# Patient Record
Sex: FEMALE | Race: WHITE | NOT HISPANIC OR LATINO | Employment: OTHER | URBAN - METROPOLITAN AREA
[De-identification: names, ages, dates, MRNs, and addresses within clinical notes are randomized per-mention and may not be internally consistent; named-entity substitution may affect disease eponyms.]

---

## 2017-02-13 DIAGNOSIS — Z94.0 KIDNEY REPLACED BY TRANSPLANT: Primary | ICD-10-CM

## 2017-02-13 DIAGNOSIS — Z94.83 PANCREAS REPLACED BY TRANSPLANT (H): ICD-10-CM

## 2017-02-13 NOTE — TELEPHONE ENCOUNTER
Drug Name: prograf 0.5mg  Last Fill Date: 12/20/16  Quantity: 30    Drug Name: prograf 1mg  Last Fill Date: 12/20/16  Quantity: 60    Drug Name: mycophenolate 360mg  Last Fill Date: 12/20/16  Quantity: 120    Laura Rosen   [facility]  [phone]

## 2017-02-14 RX ORDER — MYCOPHENOLIC ACID 360 MG/1
360 TABLET, DELAYED RELEASE ORAL 2 TIMES DAILY
Qty: 120 TABLET | Refills: 0 | Status: SHIPPED | OUTPATIENT
Start: 2017-02-14 | End: 2017-04-07

## 2017-02-14 RX ORDER — TACROLIMUS 1 MG/1
1 CAPSULE, GELATIN COATED ORAL 2 TIMES DAILY
Qty: 60 CAPSULE | Refills: 0 | Status: SHIPPED | OUTPATIENT
Start: 2017-02-14 | End: 2017-03-10

## 2017-02-14 RX ORDER — TACROLIMUS 0.5 MG/1
0.5 CAPSULE, GELATIN COATED ORAL EVERY MORNING
Qty: 30 CAPSULE | Refills: 0 | Status: SHIPPED | OUTPATIENT
Start: 2017-02-14 | End: 2017-03-10

## 2017-02-16 PROCEDURE — 80197 ASSAY OF TACROLIMUS: CPT | Performed by: INTERNAL MEDICINE

## 2017-02-16 PROCEDURE — 80180 DRUG SCRN QUAN MYCOPHENOLATE: CPT | Performed by: INTERNAL MEDICINE

## 2017-02-17 ENCOUNTER — TELEPHONE (OUTPATIENT)
Dept: TRANSPLANT | Facility: CLINIC | Age: 47
End: 2017-02-17

## 2017-02-17 DIAGNOSIS — Z94.0 KIDNEY REPLACED BY TRANSPLANT: Primary | ICD-10-CM

## 2017-02-17 DIAGNOSIS — Z79.899 OTHER LONG TERM (CURRENT) DRUG THERAPY: ICD-10-CM

## 2017-02-17 DIAGNOSIS — Z94.83 PANCREAS REPLACED BY TRANSPLANT (H): ICD-10-CM

## 2017-02-17 LAB
TACROLIMUS BLD-MCNC: 5.8 UG/L (ref 5–15)
TME LAST DOSE: NORMAL H

## 2017-02-17 NOTE — TELEPHONE ENCOUNTER
Spoke to patient and she has appointment with PCP today to discuss, she did leave message back for Florinda to schedule follow up here.

## 2017-02-17 NOTE — TELEPHONE ENCOUNTER
Hemoglobin elevated  Please call pt to see if her PCP or nephrologist has addressed this? If not, she needs to contact them and be seen. If she needs a nephrologist please invite her back here.

## 2017-02-18 LAB
MYCOPHENOLATE SERPL LC/MS/MS-MCNC: 1.07 MG/L (ref 1–3.5)
MYCOPHENOLATE-G SERPL LC/MS/MS-MCNC: 42.5 MG/L (ref 30–95)
TME LAST DOSE: NORMAL H

## 2017-03-10 DIAGNOSIS — Z94.83 PANCREAS REPLACED BY TRANSPLANT (H): ICD-10-CM

## 2017-03-10 DIAGNOSIS — Z94.0 KIDNEY REPLACED BY TRANSPLANT: ICD-10-CM

## 2017-03-10 NOTE — TELEPHONE ENCOUNTER
Drug Name: prograf 1mg  Last Fill Date: 2/15/17  Quantity: 60    Drug Name: prograf 0.5mg  Last Fill Date: 2/15/1  Quantity: 30    Laura Rosen   [facility]  [phone]

## 2017-03-13 RX ORDER — TACROLIMUS 0.5 MG/1
0.5 CAPSULE, GELATIN COATED ORAL EVERY MORNING
Qty: 30 CAPSULE | Refills: 3 | Status: SHIPPED | OUTPATIENT
Start: 2017-03-13

## 2017-03-13 RX ORDER — TACROLIMUS 1 MG/1
1 CAPSULE, GELATIN COATED ORAL 2 TIMES DAILY
Qty: 60 CAPSULE | Refills: 3 | Status: SHIPPED | OUTPATIENT
Start: 2017-03-13

## 2017-04-07 DIAGNOSIS — Z94.83 PANCREAS REPLACED BY TRANSPLANT (H): ICD-10-CM

## 2017-04-07 DIAGNOSIS — Z94.0 KIDNEY REPLACED BY TRANSPLANT: Primary | ICD-10-CM

## 2017-04-07 RX ORDER — MYCOPHENOLIC ACID 360 MG/1
360 TABLET, DELAYED RELEASE ORAL 2 TIMES DAILY
Qty: 60 TABLET | Refills: 0 | Status: SHIPPED | OUTPATIENT
Start: 2017-04-07

## 2017-04-25 ENCOUNTER — TRANSFERRED RECORDS (OUTPATIENT)
Dept: HEALTH INFORMATION MANAGEMENT | Facility: CLINIC | Age: 47
End: 2017-04-25

## 2017-04-26 ENCOUNTER — TRANSFERRED RECORDS (OUTPATIENT)
Dept: HEALTH INFORMATION MANAGEMENT | Facility: CLINIC | Age: 47
End: 2017-04-26

## 2017-08-27 ENCOUNTER — HEALTH MAINTENANCE LETTER (OUTPATIENT)
Age: 47
End: 2017-08-27

## 2018-04-19 ENCOUNTER — TELEPHONE (OUTPATIENT)
Dept: TRANSPLANT | Facility: CLINIC | Age: 48
End: 2018-04-19

## 2018-04-19 NOTE — LETTER
OUTPATIENT LABORATORY TEST ORDER:  After First Year    Patient Name: Ronit Rodrigues                           Transplant Date: 10-  YOB: 1970                                   Issue Date & Time:April 19, 201812:57 PM    [facility] MR: [id number]                                   Exp. Date (1 year after date issued)    Diagnoses:   Kidney Transplant (ICD-10 Z94.0)      Pancreas Transplant (ICD-10 Z94.83)     Long term use of medications (ICD-10  Z79.899)               Lab results to be available on the same day drawn.   Please release results to patient upon their request    Please fax to the Transplant Center at [phone].       Monthly   Complete Blood Count with Platelets    Basic Metabolic Panel (Sodium, Potassium, Chloride, CO2, Creatinine, Urea Nitrogen, Glucose, Calcium)   Amylase, Lipase   /Tacrolimus/Prograf drug level (mail to [facility] mailers and instructions provided by the pt)     Mycophenolate level     Every 6 months,     Due: April and October            BK PCR Quantitative/Serum               Complete Lipid Panel fasting (Cholesterol, Triglycerides, HDL, LDL)            Glycosylated Hemoglobin (A1c)              Urine for Protein/Creatinine    Yearly      Due: June    DSA/PRA      If you have any questions, please call The Transplant Center at [phone] or [phone].      .

## 2018-04-19 NOTE — TELEPHONE ENCOUNTER
Provider Call: Provider Call: Transplant Lab/Orders  Route to LPN  Post Transplant Days: 3470  When patient is less than 60 days post-transplant, route high priority  Reason for Call: Liz a [facility] RN would like a list of labs that the patient needs and they will put in the order.patient will be getting labs done on 4/20/18.  Liver patients reporting abnormal lab results: Route to RN and Page  Document lab facility information when provider is calling about annual lab orders. Delete facility wildcards when not needed.  Facility Name: [facility]   Facility Location: [place], [place]  Outside Facility Fax Number: Clinic Fax To Marjorie [phone]  Callback needed? If needed

## 2018-05-14 DIAGNOSIS — Z94.83 PANCREAS REPLACED BY TRANSPLANT (H): ICD-10-CM

## 2018-05-14 DIAGNOSIS — Z94.0 KIDNEY REPLACED BY TRANSPLANT: ICD-10-CM

## 2018-05-14 PROCEDURE — 80197 ASSAY OF TACROLIMUS: CPT | Performed by: SURGERY

## 2018-05-14 PROCEDURE — 80180 DRUG SCRN QUAN MYCOPHENOLATE: CPT | Performed by: SURGERY

## 2018-05-15 LAB
TACROLIMUS BLD-MCNC: 5.7 UG/L (ref 5–15)
TME LAST DOSE: NORMAL H

## 2018-05-16 LAB
MYCOPHENOLATE SERPL LC/MS/MS-MCNC: 1.51 MG/L (ref 1–3.5)
MYCOPHENOLATE-G SERPL LC/MS/MS-MCNC: 43.2 MG/L (ref 30–95)
TME LAST DOSE: NORMAL H

## 2018-10-30 DIAGNOSIS — Z94.83 PANCREAS REPLACED BY TRANSPLANT (H): ICD-10-CM

## 2018-10-30 DIAGNOSIS — Z94.0 KIDNEY REPLACED BY TRANSPLANT: ICD-10-CM

## 2018-10-30 PROCEDURE — 80197 ASSAY OF TACROLIMUS: CPT | Performed by: SURGERY

## 2018-10-30 PROCEDURE — 80180 DRUG SCRN QUAN MYCOPHENOLATE: CPT | Performed by: INTERNAL MEDICINE

## 2018-10-31 ENCOUNTER — TELEPHONE (OUTPATIENT)
Dept: TRANSPLANT | Facility: CLINIC | Age: 48
End: 2018-10-31

## 2018-10-31 DIAGNOSIS — Z94.83 PANCREAS REPLACED BY TRANSPLANT (H): Primary | ICD-10-CM

## 2018-10-31 DIAGNOSIS — Z94.0 KIDNEY REPLACED BY TRANSPLANT: ICD-10-CM

## 2018-10-31 DIAGNOSIS — R79.9 ABNORMAL FINDING OF BLOOD CHEMISTRY: ICD-10-CM

## 2018-10-31 LAB
TACROLIMUS BLD-MCNC: 5.4 UG/L (ref 5–15)
TME LAST DOSE: NORMAL H

## 2018-10-31 NOTE — TELEPHONE ENCOUNTER
Provider Call: Transplant Lab/Orders  Route to LPN  Post Transplant Days: 3665  When patient is less than 60 days post-transplant, route high priority  Reason for Call: needs a lab order for Mycophenolate   Liver patients reporting abnormal lab results: Route to RN and Page  Document lab facility information when provider is calling about annual lab orders. Delete facility wildcards when not needed.  Facility Name: [facility]  Facility Location: [place]   Callback needed? no

## 2018-10-31 NOTE — TELEPHONE ENCOUNTER
Call placed to pt regarding her labs.  Pt last set of full transplant labs 5/2018.  Pt educated we would recommend she gets her labs monthly so we can cont monitor her kp closer.  Pt aware she has a standing order for monthly labs at her local facility. Pt verbalized she will try to get labs done every 3 months.     Pt questions answered, pt v/u.

## 2018-11-02 ENCOUNTER — TELEPHONE (OUTPATIENT)
Dept: TRANSPLANT | Facility: CLINIC | Age: 48
End: 2018-11-02

## 2018-11-02 LAB
MYCOPHENOLATE SERPL LC/MS/MS-MCNC: 12.97 MG/L (ref 1–3.5)
MYCOPHENOLATE-G SERPL LC/MS/MS-MCNC: 83.9 MG/L (ref 30–95)
TME LAST DOSE: ABNORMAL H

## 2018-11-04 NOTE — TELEPHONE ENCOUNTER
9:27 AM  November 4, 2018  Have made multiple attempts to contact Ronit mcginnis [facility] lab results, Friday evening and Saturday.  Her phone goes directly to [employer], left message requesting call back.  Her mother's phone just rings with no opportunity to leave VM.  Pauline Mills, RN, CCTC  On Call Organ Coordinator

## 2018-11-05 ENCOUNTER — TELEPHONE (OUTPATIENT)
Dept: TRANSPLANT | Facility: CLINIC | Age: 48
End: 2018-11-05

## 2018-11-05 NOTE — LETTER
PHYSICIAN ORDERS      DATE & TIME ISSUED: 2018 4:03 PM  PATIENT NAME: Ronit Rodrigues   : 1970     [facility] MR# [if applicable]: [id number]     DIAGNOSIS:  Kidney and pancreas transplant   ICD-10 CODE: Z94.0 and Z94.83      Please complete the following level this week   Mycophenolate level    Any questions please call: [phone] option #5    Please fax results to [phone].    .

## 2018-11-05 NOTE — LETTER
OUTPATIENT LABORATORY TEST ORDER:  After First Year    Patient Name: Ronit Rodrigues                           Transplant Date: 10-  YOB: 1970                                   Issue Date & Time: 11-6-2018 12:57 PM    [facility] MR: [id number]                                   Exp. Date (1 year after date issued)    Diagnoses:   Kidney Transplant (ICD-10 Z94.0)      Pancreas Transplant (ICD-10 Z94.83)     Long term use of medications (ICD-10  Z79.899)               Lab results to be available on the same day drawn.   Please release results to patient upon their request    Please fax to the Transplant Center at [phone].       Monthly   Complete Blood Count with Platelets    Basic Metabolic Panel (Sodium, Potassium, Chloride, CO2, Creatinine, Urea Nitrogen, Glucose, Calcium)   Amylase, Lipase   /Tacrolimus/Prograf drug level (mail to [facility] mailers and instructions provided by the pt)     Mycophenolate level     Every 6 months,     Due: April and October            BK PCR Quantitative/Serum               Complete Lipid Panel fasting (Cholesterol, Triglycerides, HDL, LDL)            Glycosylated Hemoglobin (A1c)              Urine for Protein/Creatinine    Yearly      Due: June    DSA/PRA      If you have any questions, please call The Transplant Center at [phone] or [phone].      .

## 2018-11-05 NOTE — TELEPHONE ENCOUNTER
ISSUE:  MPA level 12.9  Goal 1-3.5  Current dose 360 mg BID.     OUTCOME:   Call placed to pt, no answer.  Left detailed message letting her know her drug level was high, but it doesn't look like a good 12 hour trough.  Pt encouraged to call us back to let us know if it was a good trough and to confirm her current dose.  Pt should repeat lab end of this week.     ADDENDUM:  Pt returned call.  Pt reports this was a short trough.  Pt confirms her current MPA dose 360 mg BID.  Pt reports she is currently living in [place] for the next 4 months.  Pt will find a local lab and will call us back with name and fax number so we can send order for pt to repeat level this week.  Pt denies abd pain or diarrhea.

## 2018-11-06 NOTE — TELEPHONE ENCOUNTER
Patient Call: Transplant Lab/Orders  Route to LPN  Post Transplant Days: 3671  When patient is less than 60 days post-transplant, route high priority    Reason for Call: needing lab orders sent to [facility] in [place], [place] Phone [phone] and Fax [phone]. Patient would also like standing lab order sent being that she will be there over the winter months.  Callback needed? If needed

## 2018-11-13 NOTE — TELEPHONE ENCOUNTER
Call placed to Ronit to see if she was able to get MPA level redrawn.  No answer.  RNCC left message for pt to return call to let us know if she was able to repeat level and when she had it drawn.  If level not drawn, pt encouraged to have drug level checked this week. Pt to return call.

## 2018-11-15 NOTE — TELEPHONE ENCOUNTER
Call placed to lab pt provided for labs.  They don't have record of pt getting labs drawn.      Call placed to pt, no answer.  Left message asking pt to repeat her MPA level d/t last MPA level elevated and not a good 12 hour trough.  Pt encouraged to call us back letting us know if she went elsewhere for labs or if she hasn't had a chance to get them done yet.

## 2019-09-12 ENCOUNTER — TELEPHONE (OUTPATIENT)
Dept: TRANSPLANT | Facility: CLINIC | Age: 49
End: 2019-09-12

## 2019-09-12 NOTE — TELEPHONE ENCOUNTER
Patient Call: Transplant Lab/Orders  Route to LPN  Post Transplant Days: 3981  When patient is less than 60 days post-transplant, route high priority    Reason for Call: Discuss lab results; which results? Recent results 9/12/19 when received    Callback needed? Yes    Return Call Needed  Same as documented in contacts section  When to return call?: Greater than one day: Route standard priority

## 2019-09-13 ENCOUNTER — TRANSFERRED RECORDS (OUTPATIENT)
Dept: HEALTH INFORMATION MANAGEMENT | Facility: CLINIC | Age: 49
End: 2019-09-13

## 2019-10-03 NOTE — TELEPHONE ENCOUNTER
Attempted to return pt call regarding questions re 9/12/19 lab results.  No answer.  Left message for pt to return call if she still has questions re lab results.

## 2019-11-07 ENCOUNTER — HEALTH MAINTENANCE LETTER (OUTPATIENT)
Age: 49
End: 2019-11-07

## 2019-11-20 ENCOUNTER — TELEPHONE (OUTPATIENT)
Dept: TRANSPLANT | Facility: CLINIC | Age: 49
End: 2019-11-20

## 2019-11-20 NOTE — TELEPHONE ENCOUNTER
Ronit Rodrigues called re: fat under chin lipo suction and wants recommendation from Transplant Team.  Wants a written clearance. Still does not know which facility she is going to have it completed yet.  States the actual procedure is called lipotherme. Made aware will update her once I hear from the Txp Team.  Lipotherme (aka laser-assisted lipolysis) is a form of liposuction that uses the heat from a laser to melt fat before suctioning it out using a small cannula. Like all fat-reduction procedures    Appreciates help.

## 2019-11-26 NOTE — TELEPHONE ENCOUNTER
Has not been seen at the  since 2015.  Per Dr. Dutta will need to be seen here at the [facility] if she is requesting clearance for procedure from Provider here.  Otherwise, she will need to ask for clearance from Dr. Puga who she reports she saw this year.

## 2020-02-17 ENCOUNTER — HEALTH MAINTENANCE LETTER (OUTPATIENT)
Age: 50
End: 2020-02-17

## 2020-05-05 ENCOUNTER — TRANSFERRED RECORDS (OUTPATIENT)
Dept: HEALTH INFORMATION MANAGEMENT | Facility: CLINIC | Age: 50
End: 2020-05-05

## 2020-11-29 ENCOUNTER — HEALTH MAINTENANCE LETTER (OUTPATIENT)
Age: 50
End: 2020-11-29

## 2021-02-14 ENCOUNTER — HEALTH MAINTENANCE LETTER (OUTPATIENT)
Age: 51
End: 2021-02-14

## 2021-04-10 ENCOUNTER — HEALTH MAINTENANCE LETTER (OUTPATIENT)
Age: 51
End: 2021-04-10

## 2021-06-08 ENCOUNTER — TRANSFERRED RECORDS (OUTPATIENT)
Dept: HEALTH INFORMATION MANAGEMENT | Facility: CLINIC | Age: 51
End: 2021-06-08
Payer: MEDICARE

## 2021-09-25 ENCOUNTER — HEALTH MAINTENANCE LETTER (OUTPATIENT)
Age: 51
End: 2021-09-25

## 2021-12-08 ENCOUNTER — TELEPHONE (OUTPATIENT)
Dept: TRANSPLANT | Facility: CLINIC | Age: 51
End: 2021-12-08
Payer: MEDICARE

## 2021-12-08 DIAGNOSIS — R74.8 ELEVATED AMYLASE AND LIPASE: ICD-10-CM

## 2021-12-08 DIAGNOSIS — Z48.298 AFTERCARE FOLLOWING ORGAN TRANSPLANT: ICD-10-CM

## 2021-12-08 DIAGNOSIS — E66.9 OBESITY (BMI 30-39.9): Primary | ICD-10-CM

## 2021-12-08 DIAGNOSIS — Z94.83 PANCREAS TRANSPLANTED (H): ICD-10-CM

## 2021-12-08 DIAGNOSIS — K43.9 HERNIA, VENTRAL: ICD-10-CM

## 2021-12-08 NOTE — LETTER
[facility] - CT Scan    [address] [place] [address]    [phone]     2021        Dear [facility] Department,       Please push images from CT scan-abdomen/pelvis completed on 21 to [facility] for patient:    Ronit Rodrigues ( 1970)  [address] [address]      Images needed prior to 2021 transplant surgery appointment.   Thank you,    Nina Hweitt, RN, BSN  Solid Organ Transplant, Post Kidney and Pancreas  Transplant Care Coordinator  [phone]

## 2021-12-08 NOTE — LETTER
PHYSICIAN ORDERS      DATE & TIME ISSUED: 2021 9:04 AM  PATIENT NAME: Ronit Rodrigues   : 1970     [facility] MR# [if applicable]: [id number]     DIAGNOSIS/ICD-10 CODES: Pancreas Transplanted (Z94.83);   Elevated Amylase and Lipase (R74.8)    Please complete the following imaging on 12/10/21:  Ultrasound Pancreas Transplant    Any questions please call: [phone]  Fax results to [phone]      Tom Whitley MD

## 2021-12-08 NOTE — LETTER
PHYSICIAN ORDERS      DATE & TIME ISSUED: 2021 4:52 PM  PATIENT NAME: Ronit Rodrigues   : 1970     [facility] MR# [if applicable]: [id number]     DIAGNOSIS/ICD-10 CODES: Pancreas Transplanted (Z94.83);   Kidney Transplanted;   Elevated Amylase and Lipase (R74.8)    Please draw the following labs on 12/10/21:  - Complete Blood Count with Platelets   - Basic Metabolic Panel (Sodium, Potassium, Chloride, CO2, Creatinine, Urea Nitrogen, Glucose, Calcium)  - Amylase, Lipase  - /Tacrolimus/Prograf drug level (ensure 12 hour trough level)           - Mycophenolate level    - Glycosylated Hemoglobin (A1c)     Any questions please call: [phone]  Fax results to [phone]      Tom Whitley MD

## 2021-12-08 NOTE — TELEPHONE ENCOUNTER
Spoke with [facility] nurse for Dr. Puga, who manages patient. They want to re-refer patient for pancreas transplant. Also spoke with patient.     Elevated pancreas enzymes on labs 12/6/21 (Labs prior to this from 12/28/20): Last HgbA1C 5.2 on 12/28/20:   Lipase 426 (8-78 U/L) baseline 10-30s  Amylase 327 ( U/L) baseline 40-50s  On lantus and humolog per nurse report however per patient, Not taking insulin or checking BG.     Pt states she ate greasy food same day of labs.   Recommend repeat pancreas enzyme levels within the next week now that she reports good bowel clean out. Discussed daily bowel movements, applesauce consistency. If not having then recommend adding Miralax PRN.     Abdominal pain noted. Not eating for couple days due to things not moving through, bloating/gas. Mentions hernia rupture; but she had not had repaired. General surgery appt tomorrow. Will discuss her CT results from 12/6/21 with surgeon as well as gallbladder stones.      IMPRESSION:     1. Large midline infraumbilical ventral wall hernia containing multiple noninflamed and nonobstructed loops of small bowel.   2. Small midline supraumbilical fat-containing hernia/diastases.   3. Left lower quadrant renal transplant, and right lower quadrant pancreas transplant without evident complication on this unenhanced examination.   4. Cholelithiasis.    Taking :  -Prograf 1.5 mg AM/1 mg PM. Last tacrolimus level not accurate; had taken prograf dose that morning.  - mg BID.   -Chlorthalidone 25 mg daily    Wants referral for hernia repair to be done with Dr. Durant/Dr. Goldman. Consider re-establishing care at .     [facility] reviewed patient in chronic review meeting with Dr. Goldman and Dr. Whitley. Enteric drained pancreas now 13 years out from transplant. Order DSA, 12 hour tacrolimus and MPA levels, HgbA1C. (Lab orders sent.) Get pancreas ultrasound (scheduled 3 pm 12/13/21 [facility]). Scheduled visit with Dr. Awan  Susi 4 pm on 12/13/21. Ronit Hewitt, RN, BSN  Solid Organ Transplant, Post Kidney and Pancreas  Transplant Care Coordinator  [phone]     Request for CT images to be pushed to [facility] from 12/16/21:   [facility]    [address] [place] [address]    [phone]

## 2021-12-08 NOTE — LETTER
OUTPATIENT LABORATORY TEST ORDER:  After First Year    Patient Name: Ronit Rodrigues                           Transplant Date: 10-  YOB: 1970                                   Issue Date & Time: 11-6-2018 12:57 PM    [facility] MR: [id number]                                   Exp. Date (1 year after date issued)    Diagnoses:   Kidney Transplant (ICD-10 Z94.0)      Pancreas Transplant (ICD-10 Z94.83)     Long term use of medications (ICD-10  Z79.899)               Lab results to be available on the same day drawn.   Please release results to patient upon their request    Please fax to the Transplant Center at [phone].       Monthly   Complete Blood Count with Platelets    Basic Metabolic Panel (Sodium, Potassium, Chloride, CO2, Creatinine, Urea Nitrogen, Glucose, Calcium)   Amylase, Lipase   /Tacrolimus/Prograf drug level (mail to [facility] mailers and instructions provided by the pt)     Mycophenolate level     Every 6 months,     Due: April and October            BK PCR Quantitative/Serum               Complete Lipid Panel fasting (Cholesterol, Triglycerides, HDL, LDL)            Glycosylated Hemoglobin (A1c)              Urine for Protein/Creatinine    Yearly      Due: June    DSA/PRA      If you have any questions, please call The Transplant Center at [phone] or [phone].      .

## 2021-12-10 ENCOUNTER — LAB (OUTPATIENT)
Dept: LAB | Facility: CLINIC | Age: 51
End: 2021-12-10
Payer: MEDICARE

## 2021-12-10 PROCEDURE — 36415 COLL VENOUS BLD VENIPUNCTURE: CPT

## 2021-12-10 PROCEDURE — 80197 ASSAY OF TACROLIMUS: CPT

## 2021-12-11 LAB
TACROLIMUS BLD-MCNC: 4.9 UG/L (ref 5–15)
TME LAST DOSE: ABNORMAL H
TME LAST DOSE: ABNORMAL H

## 2021-12-13 ENCOUNTER — OFFICE VISIT (OUTPATIENT)
Dept: TRANSPLANT | Facility: CLINIC | Age: 51
End: 2021-12-13
Attending: INTERNAL MEDICINE
Payer: MEDICARE

## 2021-12-13 ENCOUNTER — ANCILLARY PROCEDURE (OUTPATIENT)
Dept: ULTRASOUND IMAGING | Facility: CLINIC | Age: 51
End: 2021-12-13
Attending: INTERNAL MEDICINE
Payer: MEDICARE

## 2021-12-13 VITALS
WEIGHT: 219 LBS | DIASTOLIC BLOOD PRESSURE: 85 MMHG | HEART RATE: 100 BPM | BODY MASS INDEX: 36.44 KG/M2 | OXYGEN SATURATION: 97 % | SYSTOLIC BLOOD PRESSURE: 144 MMHG

## 2021-12-13 DIAGNOSIS — Z94.83 PANCREAS TRANSPLANTED (H): ICD-10-CM

## 2021-12-13 DIAGNOSIS — Z48.298 AFTERCARE FOLLOWING ORGAN TRANSPLANT: ICD-10-CM

## 2021-12-13 DIAGNOSIS — K43.9 VENTRAL HERNIA WITHOUT OBSTRUCTION OR GANGRENE: ICD-10-CM

## 2021-12-13 DIAGNOSIS — R74.8 ELEVATED AMYLASE AND LIPASE: ICD-10-CM

## 2021-12-13 DIAGNOSIS — E66.01 MORBID OBESITY (H): Primary | ICD-10-CM

## 2021-12-13 PROCEDURE — 99203 OFFICE O/P NEW LOW 30 MIN: CPT | Performed by: SURGERY

## 2021-12-13 PROCEDURE — 93975 VASCULAR STUDY: CPT | Mod: GC | Performed by: RADIOLOGY

## 2021-12-13 RX ORDER — CHOLECALCIFEROL (VITAMIN D3) 50 MCG
2 TABLET ORAL DAILY
COMMUNITY

## 2021-12-13 NOTE — PROGRESS NOTES
Transplant Surgery Progress Note    Transplants:  10/18/2008 (Kidney / Pancreas)     S: amylase/lipase have normalized. Graft function remains excellent. She follows up today for evaluation of large incisional hernia. She previously had this repaired with mesh in 2012 (ventrio ST lower, ventralex ST higher). She had a fairly prompt recurrence of hernia due to abdominal straining. She did see the complex hernia group here for consideration of repair some years ago (Isaac Tarango and Noreen). She was advised to lose weight, either on her own or with bariatric surgery intervention. She did lose the weight on her own but then decided against hernia repair.    She feels that her hernia has been growing since that time and is interested in repair. She is additionally troubled by feelings of back pain and abdominal 'instability' in core strength. She is on tac/mpa immsx maintenance and is a never smoker.  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   Transplant Date:  10/18/2008 (Kidney / Pancreas)  Baseline Cr: 0.96  De[place]ovo DSA: No    Acute Rejection Hx:  No    Present Maintenance Immunosuppression:  Tacrolimus and Mycophenolic acid    Transplant Coordinator: Lubna Perez     Transplant Office Phone Number: [phone]     Immunosuppressant Medications     Immunosuppressive Agents Disp Start End     mycophenolic acid (MYFORTIC - GENERIC EQUIVALENT) 360 MG EC tablet    60 tablet 4/7/2017     Sig - Route: Take 1 tablet (360 mg) by mouth 2 times daily Annual appointment needed for medication refills - Oral    Class: E-Prescribe    Notes to Pharmacy: Appointment and labs needed     PROGRAF 0.5 MG PO CAPSULE    30 capsule 3/13/2017     Sig - Route: Take 1 capsule (0.5 mg) by mouth every morning Take with 1 mg capsule. Total dose is 1.5 mg every morning and 1 mg every evening. - Oral    Class: E-Prescribe     PROGRAF 1 MG PO CAPSULE    60 capsule 3/13/2017     Sig - Route:  Take 1 capsule (1 mg) by mouth 2 times daily Take with 0.5 mg capsules. Total dose is 1.5 mg every morning and 1 mg every evening. - Oral    Class: E-Prescribe          Possible Immunosuppression-related side effects:   []             headache  []             vivid dreams  []             irritability  []             cognitive difficuties  []             fine tremor  []             nausea  []             diarrhea  []             neuropathy      []             edema  []             renal calcineurin toxicity  []             hyperkalemia  []             post-transplant diabetes  []             decreased appetite  []             increased appetite  []             other:  []             none    Prescription Medications as of 8/19/2022       Rx Number Disp Refills Start End Last Dispensed Date Next Fill Date Owning Pharmacy    chlorthalidone (HYGROTEN) 25 MG tablet  32 tablet 0 8/27/2012    [facility] [place] [address]    Sig: Take 1 tablet by mouth daily.    Class: E-Prescribe    Notes to Pharmacy: Needs to see MD for further refills    Route: Oral    Cosign for Ordering: Accepted by Cj Dutta MD on 8/27/2012  4:11 PM    mycophenolic acid (MYFORTIC - GENERIC EQUIVALENT) 360 MG EC tablet  60 tablet 0 4/7/2017    [facility] - [place], [place] - [facility] [address]    Sig: Take 1 tablet (360 mg) by mouth 2 times daily Annual appointment needed for medication refills    Class: E-Prescribe    Notes to Pharmacy: Appointment and labs needed    Route: Oral    Omega-3 Fatty Acids (OMEGA-3 FISH OIL PO)            Sig: Take by mouth daily    Class: Historical    Route: Oral    phentermine 15 MG capsule  30 capsule 3 2/12/2015    [id number] - [place], [place] - [address]    Sig: Take 1 capsule (15 mg) by mouth daily    Class: No Print Out    Route: Oral    polyethylene glycol (MIRALAX/GLYCOLAX) packet  30 packet 5 6/12/2015    [facility] - [address]    Sig: Take 17 g by mouth daily    Class: E-Prescribe    Route: Oral    Prenatal Vit-Fe Fumarate-FA (PNV PRENATAL PLUS MULTIVITAMIN) 27-1 MG TABS  30 tablet 11 6/12/2015    [facility] - [address]    Sig: Take 1 tablet by mouth daily    Class: E-Prescribe    Route: Oral    PROGRAF 0.5 MG PO CAPSULE  30 capsule 3 3/13/2017    [facility] [address]    Sig: Take 1 capsule (0.5 mg) by mouth every morning Take with 1 mg capsule. Total dose is 1.5 mg every morning and 1 mg every evening.    Class: E-Prescribe    Route: Oral    PROGRAF 1 MG PO CAPSULE  60 capsule 3 3/13/2017    [facility] [address]    Sig: Take 1 capsule (1 mg) by mouth 2 times daily Take with 0.5 mg capsules. Total dose is 1.5 mg every morning and 1 mg every evening.    Class: E-Prescribe    Route: Oral    vitamin D3 (CHOLECALCIFEROL) 50 mcg (2000 units) tablet        [facility]    Sig: Take 2 tablets by mouth daily    Class: Historical    Route: Oral          O:      General Appearance: in no apparent distress.   Skin: Normal, no rashes or jaundice  Heart: regular rate and rhythm, normal S1 and S2  Lungs: easy respirations, no audible wheezing.  Abdomen: abdomen soft, nontender. No tenderness over grafts. No ascites or masses. Large midline hernia- soft and reducible.  Extremities: Tremor absent.   Edema: absent.    Transplant Immunosuppression Labs Latest Ref Rng & Units 10/30/2018 5/14/2018 2/16/2017 3/9/2016 10/29/2015   Siro Level 5.0 - 15.0 ug/L - - - - -   Siro Level - - - - - -   Tacro Level 5.0 - 15.0 ug/L 5.4 5.7 5.8 3.7(L) 7.8   Tacro Level - 10/29/2018 12:00 0001 05/14/2018 2/15/17 2300 03/08/16 2300 [id number]   Creat 0.52 - 1.04 mg/dL - - - - -   BUN 7 - 30 mg/dL - - - - -   WBC 4.0 - 11.0  10e9/L - - - - -   Neutrophil % - - - - -   ANEU 1.6 - 8.3 10e9/L - - - - -       Chemistries:   Recent Labs   Lab Test 06/12/15  0640   BUN 10   CR 0.75   GFRESTIMATED 84   GLC 73     Lab Results   Component Value Date    A1C 5.4 12/17/2014     Recent Labs   Lab Test 06/08/15  1035   ALBUMIN 3.6   BILITOTAL 0.4   ALKPHOS 75   AST 23   ALT 23     Urine Studies:  Recent Labs   Lab Test 06/08/15  2240   COLOR Yellow   APPEARANCE Clear   URINEGLC Negative   URINEBILI Negative   URINEKETONE 10*   SG 1.025   UBLD Negative   URINEPH 5.0   PROTEIN 10*   NITRITE Negative   LEUKEST Negative   RBCU 1   WBCU 1     No lab results found.  Hematology:   Recent Labs   Lab Test 06/12/15  0640 06/11/15  0630 06/10/15  0612   HGB 14.9 14.5 14.6    242 207   WBC 6.6 7.8 8.1     Coags:   No lab results found.  HLA antibodies:   SA1 Hi Risk Serene   Date Value Ref Range Status   09/14/2016 None  Final     SA1 Mod Risk Serene   Date Value Ref Range Status   09/14/2016 B:76  Final     SA2 Hi Risk Serene   Date Value Ref Range Status   09/14/2016 DQ:4 8  Final     SA2 Mod Risk Serene   Date Value Ref Range Status   09/14/2016 DRw:53 DQ:7 9  Final       Assessment: Ronit Rodrigues is doing well s/p DDKT (SPK):  Issues we addressed during her visit include:    Incisional hernia: BMI 37 today. Target BMI for repair ~30, weight 179lbs. Saw Link Tarango and Noreen with complex hernia group previously. She lost weight on her own at that time but decided against surgery. Advised that she will need weight loss prior to proceeding this time as well to help ensure likelihood of success. She will likely need a component separation repair with mesh. Given the multidisciplinary nature of this approach and the need for bariatric SWL versus surgery, will re-refer to weight management and the complex hernia group here.      Plan:    1. Graft function: good, stable  2. Immunosuppression Management: No change no change continue tac and mpa .  Complexity of  management:Low.  Contributing factors: none  3. hernia: as above  Followup: PRN    Total Time: 30 min,   Counselling Time: 25 min.      Lv Goldman MD

## 2021-12-13 NOTE — LETTER
12/13/2021         RE: Ronit Rodrigues  [address]  [facility] [address]        Dear Colleague,    Thank you for referring your patient, Ronit Rodrigues, to the [facility]. Please see a copy of my visit note below.    Lipase/amylase normalized. Graft function remains excellent    Concerned about large incisional hernia. Has back pain and feelings of abdominal weakness and instability. Had it repaired previously with fairly prompt recurrence due to abdominal straining. Feels hernia has been progressing since that time and is interested in repair.     Pancreas graft is functioning, never smoker, on tac/mpa maintenance.    BMI 37 today. Target BMI for repair ~30, weight 179lbs. Saw Link Tarango and Noreen with complex hernia group previously. She lost weight on her own at that time but decided against surgery. Advised that she will need weight loss prior to proceeding this time as well to help ensure likelihood of success. She will likely need a component separation repair with mesh. Given the multidisciplinary nature of this approach and the need for bariatric SWL versus surgery, will re-refer to the complex hernia group here      Again, thank you for allowing me to participate in the care of your patient.        Sincerely,        Lv Goldman MD

## 2021-12-15 NOTE — TELEPHONE ENCOUNTER
"- Lab results for pancreas (amylase & lipase) enzymes normalized.   - HgbA1C 5.4.   - Tacrolimus 12 hour trough 4.9 on 12/10/21 1128 AM, goal 5-8, meds taken at 9PM? Late trough?    Results from pancreas transplant ultrasound:  Study Result    Narrative & Impression   EXAMINATION: US PANCREAS TRANSPLANT, 12/13/2021 3:14 PM      COMPARISON: CT abdomen and pelvis 6/8/2015     HISTORY: Elevated pancreatic enzymes.     TECHNIQUE:  Grey-scale, color Doppler and spectral flow analysis.     Findings:  Limited examination due to presence of large amount of bowel gas from  overlying ventral wall hernia.     The transplanted pancreas is located in the right lower quadrant and  demonstrates normal echogenicity. There is no free fluid adjacent to  the transplant pancreas. The pancreatic duct measures 2.3 mm.     Transplant pancreas arterial flow:   Within pancreas: Not well-visualized.  Outside pancreas: 81 cm/sec.   Anastomosis: 98 cm/sec.     Transplant pancreas venous flow:  Within pancreas: Not well visualized.  Outside pancreas: 29 cm/sec.   Anastomosis: 53 cm/sec.     Iliac artery:  Above the anastomosis: 177 cm/sec.  Below the anastomosis: 150 cm/sec.     Iliac vein  Above the transplant: 73 cm/sec.  Below the transplant: 23 cm/sec.                                                                      Impression:   Limited evaluation due to bowel gas from overlying large ventral wall  hernia.  1. Normal grayscale ultrasound appearance of the right lower quadrant  pancreas transplant. No pancreatic ductal dilatation.  2. No evidence of arterial or venous stenosis.     I have personally reviewed the examination and initial interpretation  and I agree with the findings.     JONI BENITEZ DO         SYSTEM ID:  [id number]     Transplant surgery appointment completed 12/13/2021 for ventral hernia findings on outside CT scan done 12/6/21. Per Dr. Goldman visit notes, \"target BMI for repair ~30, weight 179 lbs.\" Current BMI " "37, weight 219 lbs. \"Re-refer to the complex hernia group.\" Need for weight loss management team.     RNCC called office of Dr. Puga to update nurse.    Nina Hewitt RN, BSN  Solid Organ Transplant, Post Kidney and Pancreas  Transplant Care Coordinator  [phone]         "

## 2021-12-21 NOTE — TELEPHONE ENCOUNTER
RNCC spoke with Ronit in regards to seeing Corinne Saul PA-C with [facility] weight management surgery team. Gave recommendation per Dr. Durant for gastric sleeve given BMI 37. She declined referral for bariatric surgery and states that she was encouraged by her visit with Dr. Goldman that because she lost the weight before on her own, she could lose the weight again with lifestyle changes and coaching from dietician. Ordered referral to weight management/[facility] for dietician consult; notified Dr. Goldman.

## 2021-12-22 NOTE — TELEPHONE ENCOUNTER
RE: Weight management referral  Received: Yesterday  Lv Goldman MD [place], Christin Rebecca, CHAGO Wood-     That sounds good. She should see the complex hernia group, but needs to lose the weight first, or get pretty close. I'd tell her to work with her dietary folks until her weight is close to BMI 30 (I think 170 for her or so) and then we can refer her to the hernia folks. If she's not able to make progress with weight loss on her own in a few months we can offer her a referral to bariatrics at that point again, just so she's not spinning her wheels.     Thanks!  Lv             Previous Messages       ----- Message -----   From: Nina Hewitt RN   Sent: 12/21/2021   4:58 PM CST   To: Lv Goldman MD   Subject: Weight management referral                       Dr. Goldman,     I spoke with Ronit in regards to referral for bariatric surgery for gastric sleeve; sounds pretty against gastric sleeve. She is interested in weight management/lifestyle program referral first with dietician to try to loose weight again on her own. I placed order for this. Let me know if there is anything else. She sounds like she is interested in seeing complex hernia provider as discussed at appointment; not sure if that is another referral?     Thanks,     Nina Hewitt, RN, BSN   Solid Organ Transplant, Post Kidney and Pancreas   Transplant Care Coordinator   [phone]

## 2022-02-01 ENCOUNTER — TELEPHONE (OUTPATIENT)
Dept: TRANSPLANT | Facility: CLINIC | Age: 52
End: 2022-02-01

## 2022-02-01 NOTE — TELEPHONE ENCOUNTER
Patient Call: Wanted to discuss care plan and health issues      Call back needed? Yes    Return Call Needed  Same as documented in contacts section  When to return call?: Greater than one day: Route standard priority

## 2022-02-02 NOTE — TELEPHONE ENCOUNTER
"Ronit states she has not heard from dietician as referred and has sought out dietician at [facility] for help losing weight. She wants to know how much weight she needs to loose. \"Feels that 40-45 lbs is above and beyond but doesn't want to mess around as she has to be under anesthesia for a number of hours.\"    Reviewed Dr. Goldman's visit notes from 12/13/2021:    Lipase/amylase normalized. Graft function remains excellent     Concerned about large incisional hernia. Has back pain and feelings of abdominal weakness and instability. Had it repaired previously with fairly prompt recurrence due to abdominal straining. Feels hernia has been progressing since that time and is interested in repair.      Pancreas graft is functioning, never smoker, on tac/mpa maintenance.     BMI 37 today. Target BMI for repair ~30, weight 179lbs. Saw Link Tarango and Noreen with complex hernia group previously. She lost weight on her own at that time but decided against surgery. Advised that she will need weight loss prior to proceeding this time as well to help ensure likelihood of success. She will likely need a component separation repair with mesh. Given the multidisciplinary nature of this approach and the need for bariatric SWL versus surgery, will re-refer to the complex hernia group here    Would like to address who is the best person to go to for hernia repair within complex hernia group here; is it option to start with video visit.     "

## 2022-03-12 ENCOUNTER — HEALTH MAINTENANCE LETTER (OUTPATIENT)
Age: 52
End: 2022-03-12

## 2022-05-01 ENCOUNTER — HEALTH MAINTENANCE LETTER (OUTPATIENT)
Age: 52
End: 2022-05-01

## 2022-05-31 ENCOUNTER — TELEPHONE (OUTPATIENT)
Dept: TRANSPLANT | Facility: CLINIC | Age: 52
End: 2022-05-31
Payer: MEDICARE

## 2022-06-03 NOTE — TELEPHONE ENCOUNTER
ISSUE: Patient states she was seen in [facility] yesterday 6/3/2022    Patient reports fatigue, chills, hot flashes, weakness X3-4 days X3 since June 2021     Labs unavailable for RNCC in care everywhere at this time, labs reviewed with patient via phone and are stable.    Encouraged patient to make appointment with PCP.      Lubna Perez RN   Transplant Coordinator  [phone]

## 2022-07-06 ENCOUNTER — TRANSFERRED RECORDS (OUTPATIENT)
Dept: HEALTH INFORMATION MANAGEMENT | Facility: CLINIC | Age: 52
End: 2022-07-06

## 2022-08-19 ENCOUNTER — TRANSFERRED RECORDS (OUTPATIENT)
Dept: HEALTH INFORMATION MANAGEMENT | Facility: CLINIC | Age: 52
End: 2022-08-19

## 2022-08-19 PROBLEM — E66.01 MORBID OBESITY (H): Status: ACTIVE | Noted: 2022-08-19

## 2022-11-28 ENCOUNTER — TELEPHONE (OUTPATIENT)
Dept: TRANSPLANT | Facility: CLINIC | Age: 52
End: 2022-11-28

## 2022-11-28 ENCOUNTER — HOSPITAL ENCOUNTER (INPATIENT)
Facility: CLINIC | Age: 52
LOS: 2 days | Discharge: HOME | DRG: 438 | End: 2022-11-30
Attending: EMERGENCY MEDICINE | Admitting: SURGERY
Payer: MEDICARE

## 2022-11-28 ENCOUNTER — APPOINTMENT (OUTPATIENT)
Dept: CT IMAGING | Facility: CLINIC | Age: 52
DRG: 438 | End: 2022-11-28
Attending: EMERGENCY MEDICINE
Payer: MEDICARE

## 2022-11-28 DIAGNOSIS — K85.90 ACUTE PANCREATITIS, UNSPECIFIED COMPLICATION STATUS, UNSPECIFIED PANCREATITIS TYPE: ICD-10-CM

## 2022-11-28 DIAGNOSIS — Z11.52 ENCOUNTER FOR SCREENING LABORATORY TESTING FOR SEVERE ACUTE RESPIRATORY SYNDROME CORONAVIRUS 2 (SARS-COV-2): ICD-10-CM

## 2022-11-28 DIAGNOSIS — K56.609 SBO (SMALL BOWEL OBSTRUCTION) (H): ICD-10-CM

## 2022-11-28 LAB
ALBUMIN SERPL BCG-MCNC: 4 G/DL (ref 3.5–5.2)
ALP SERPL-CCNC: 85 U/L (ref 35–104)
ALT SERPL W P-5'-P-CCNC: 18 U/L (ref 10–35)
ANION GAP SERPL CALCULATED.3IONS-SCNC: 15 MMOL/L (ref 7–15)
AST SERPL W P-5'-P-CCNC: ABNORMAL U/L
BASOPHILS # BLD AUTO: 0 10E3/UL (ref 0–0.2)
BASOPHILS NFR BLD AUTO: 0 %
BILIRUB SERPL-MCNC: 0.6 MG/DL
BUN SERPL-MCNC: 23.2 MG/DL (ref 6–20)
CALCIUM SERPL-MCNC: 9.7 MG/DL (ref 8.6–10)
CHLORIDE SERPL-SCNC: 99 MMOL/L (ref 98–107)
CREAT SERPL-MCNC: 1.19 MG/DL (ref 0.51–0.95)
DEPRECATED HCO3 PLAS-SCNC: 22 MMOL/L (ref 22–29)
EOSINOPHIL # BLD AUTO: 0 10E3/UL (ref 0–0.7)
EOSINOPHIL NFR BLD AUTO: 0 %
ERYTHROCYTE [DISTWIDTH] IN BLOOD BY AUTOMATED COUNT: 14 % (ref 10–15)
GFR SERPL CREATININE-BSD FRML MDRD: 55 ML/MIN/1.73M2
GLUCOSE SERPL-MCNC: 116 MG/DL (ref 70–99)
HCT VFR BLD AUTO: 43.4 % (ref 35–47)
HGB BLD-MCNC: 14.4 G/DL (ref 11.7–15.7)
IMM GRANULOCYTES # BLD: 0.1 10E3/UL
IMM GRANULOCYTES NFR BLD: 1 %
LACTATE SERPL-SCNC: 1.2 MMOL/L (ref 0.7–2)
LIPASE SERPL-CCNC: 517 U/L (ref 13–60)
LYMPHOCYTES # BLD AUTO: 1.6 10E3/UL (ref 0.8–5.3)
LYMPHOCYTES NFR BLD AUTO: 12 %
MCH RBC QN AUTO: 27.9 PG (ref 26.5–33)
MCHC RBC AUTO-ENTMCNC: 33.2 G/DL (ref 31.5–36.5)
MCV RBC AUTO: 84 FL (ref 78–100)
MONOCYTES # BLD AUTO: 1 10E3/UL (ref 0–1.3)
MONOCYTES NFR BLD AUTO: 8 %
NEUTROPHILS # BLD AUTO: 10.5 10E3/UL (ref 1.6–8.3)
NEUTROPHILS NFR BLD AUTO: 79 %
NRBC # BLD AUTO: 0 10E3/UL
NRBC BLD AUTO-RTO: 0 /100
PLATELET # BLD AUTO: 290 10E3/UL (ref 150–450)
POTASSIUM SERPL-SCNC: 4.7 MMOL/L (ref 3.4–5.3)
PROT SERPL-MCNC: 8.6 G/DL (ref 6.4–8.3)
RBC # BLD AUTO: 5.17 10E6/UL (ref 3.8–5.2)
SODIUM SERPL-SCNC: 136 MMOL/L (ref 136–145)
WBC # BLD AUTO: 13.2 10E3/UL (ref 4–11)

## 2022-11-28 PROCEDURE — 83690 ASSAY OF LIPASE: CPT | Performed by: EMERGENCY MEDICINE

## 2022-11-28 PROCEDURE — 87799 DETECT AGENT NOS DNA QUANT: CPT | Performed by: EMERGENCY MEDICINE

## 2022-11-28 PROCEDURE — 36415 COLL VENOUS BLD VENIPUNCTURE: CPT | Performed by: EMERGENCY MEDICINE

## 2022-11-28 PROCEDURE — 84155 ASSAY OF PROTEIN SERUM: CPT | Performed by: EMERGENCY MEDICINE

## 2022-11-28 PROCEDURE — 99285 EMERGENCY DEPT VISIT HI MDM: CPT | Performed by: EMERGENCY MEDICINE

## 2022-11-28 PROCEDURE — G1010 CDSM STANSON: HCPCS | Performed by: RADIOLOGY

## 2022-11-28 PROCEDURE — 83605 ASSAY OF LACTIC ACID: CPT | Performed by: EMERGENCY MEDICINE

## 2022-11-28 PROCEDURE — 99221 1ST HOSP IP/OBS SF/LOW 40: CPT | Mod: AI | Performed by: SURGERY

## 2022-11-28 PROCEDURE — 96374 THER/PROPH/DIAG INJ IV PUSH: CPT

## 2022-11-28 PROCEDURE — 120N000002 HC R&B MED SURG/OB UMMC

## 2022-11-28 PROCEDURE — 258N000003 HC RX IP 258 OP 636: Performed by: EMERGENCY MEDICINE

## 2022-11-28 PROCEDURE — C9803 HOPD COVID-19 SPEC COLLECT: HCPCS

## 2022-11-28 PROCEDURE — 99285 EMERGENCY DEPT VISIT HI MDM: CPT | Mod: 25

## 2022-11-28 PROCEDURE — 74176 CT ABD & PELVIS W/O CONTRAST: CPT | Mod: 26 | Performed by: RADIOLOGY

## 2022-11-28 PROCEDURE — G1010 CDSM STANSON: HCPCS

## 2022-11-28 PROCEDURE — 85025 COMPLETE CBC W/AUTO DIFF WBC: CPT | Performed by: EMERGENCY MEDICINE

## 2022-11-28 PROCEDURE — 250N000011 HC RX IP 250 OP 636: Performed by: EMERGENCY MEDICINE

## 2022-11-28 PROCEDURE — U0003 INFECTIOUS AGENT DETECTION BY NUCLEIC ACID (DNA OR RNA); SEVERE ACUTE RESPIRATORY SYNDROME CORONAVIRUS 2 (SARS-COV-2) (CORONAVIRUS DISEASE [COVID-19]), AMPLIFIED PROBE TECHNIQUE, MAKING USE OF HIGH THROUGHPUT TECHNOLOGIES AS DESCRIBED BY CMS-2020-01-R: HCPCS | Performed by: EMERGENCY MEDICINE

## 2022-11-28 PROCEDURE — 96372 THER/PROPH/DIAG INJ SC/IM: CPT | Performed by: EMERGENCY MEDICINE

## 2022-11-28 RX ORDER — LORAZEPAM 2 MG/ML
1 INJECTION INTRAMUSCULAR ONCE
Status: DISCONTINUED | OUTPATIENT
Start: 2022-11-28 | End: 2022-11-30

## 2022-11-28 RX ORDER — PROCHLORPERAZINE 25 MG
25 SUPPOSITORY, RECTAL RECTAL EVERY 12 HOURS PRN
Status: DISCONTINUED | OUTPATIENT
Start: 2022-11-28 | End: 2022-11-30 | Stop reason: HOSPADM

## 2022-11-28 RX ORDER — SODIUM CHLORIDE 9 MG/ML
INJECTION, SOLUTION INTRAVENOUS CONTINUOUS
Status: DISCONTINUED | OUTPATIENT
Start: 2022-11-28 | End: 2022-11-28

## 2022-11-28 RX ORDER — ONDANSETRON 2 MG/ML
4 INJECTION INTRAMUSCULAR; INTRAVENOUS EVERY 6 HOURS PRN
Status: DISCONTINUED | OUTPATIENT
Start: 2022-11-28 | End: 2022-11-30

## 2022-11-28 RX ORDER — SODIUM CHLORIDE, SODIUM LACTATE, POTASSIUM CHLORIDE, CALCIUM CHLORIDE 600; 310; 30; 20 MG/100ML; MG/100ML; MG/100ML; MG/100ML
1000 INJECTION, SOLUTION INTRAVENOUS CONTINUOUS
Status: DISCONTINUED | OUTPATIENT
Start: 2022-11-28 | End: 2022-11-30

## 2022-11-28 RX ORDER — TACROLIMUS 0.5 MG/1
0.5 CAPSULE, GELATIN COATED ORAL EVERY MORNING
Status: DISCONTINUED | OUTPATIENT
Start: 2022-11-29 | End: 2022-11-29

## 2022-11-28 RX ORDER — OXYCODONE HYDROCHLORIDE 5 MG/1
5 TABLET ORAL EVERY 6 HOURS PRN
Status: DISCONTINUED | OUTPATIENT
Start: 2022-11-28 | End: 2022-11-29

## 2022-11-28 RX ORDER — ONDANSETRON 4 MG/1
4 TABLET, ORALLY DISINTEGRATING ORAL EVERY 6 HOURS PRN
Status: DISCONTINUED | OUTPATIENT
Start: 2022-11-28 | End: 2022-11-30 | Stop reason: HOSPADM

## 2022-11-28 RX ORDER — PROCHLORPERAZINE MALEATE 10 MG
10 TABLET ORAL EVERY 6 HOURS PRN
Status: DISCONTINUED | OUTPATIENT
Start: 2022-11-28 | End: 2022-11-30 | Stop reason: HOSPADM

## 2022-11-28 RX ORDER — MYCOPHENOLIC ACID 360 MG/1
360 TABLET, DELAYED RELEASE ORAL 2 TIMES DAILY
Status: DISCONTINUED | OUTPATIENT
Start: 2022-11-28 | End: 2022-11-30 | Stop reason: HOSPADM

## 2022-11-28 RX ORDER — TACROLIMUS 1 MG/1
1 CAPSULE, GELATIN COATED ORAL
Status: DISCONTINUED | OUTPATIENT
Start: 2022-11-28 | End: 2022-11-29

## 2022-11-28 RX ORDER — ACETAMINOPHEN 325 MG/1
650 TABLET ORAL EVERY 4 HOURS PRN
Status: DISCONTINUED | OUTPATIENT
Start: 2022-11-28 | End: 2022-11-30 | Stop reason: HOSPADM

## 2022-11-28 RX ADMIN — SODIUM CHLORIDE: 9 INJECTION, SOLUTION INTRAVENOUS at 20:47

## 2022-11-28 RX ADMIN — HYDROMORPHONE HYDROCHLORIDE 1 MG: 1 INJECTION, SOLUTION INTRAMUSCULAR; INTRAVENOUS; SUBCUTANEOUS at 18:28

## 2022-11-28 RX ADMIN — SODIUM CHLORIDE 1000 ML: 9 INJECTION, SOLUTION INTRAVENOUS at 19:46

## 2022-11-28 ASSESSMENT — ACTIVITIES OF DAILY LIVING (ADL)
ADLS_ACUITY_SCORE: 35

## 2022-11-28 NOTE — TELEPHONE ENCOUNTER
Is having emesis,her hernia is Tight in the abdominal area very painful   please call Ronit not sure what she needs to do

## 2022-11-28 NOTE — ED TRIAGE NOTES
"Triage Assessment & Note:    /69   Pulse 98   Temp 98.6  F (37  C) (Temporal)   Resp 16   Ht 1.651 m (5' 5\")   Wt 99.8 kg (220 lb)   SpO2 98%   BMI 36.61 kg/m      Patient presents with: PT BIBA for abd pain and is concerned for a strangulated hernia. PT was given 1 mg dil IM. PT is a kidney/panc txp pt.     Home Treatments/Remedies: 1mg Dil ems    Febrile / Afebrile? Afebrile     Duration of C/o: 24 hrs   Aimlcar Grewal RN  November 28, 2022       Triage Assessment     Row Name 11/28/22 1417       Triage Assessment (Adult)    Airway WDL WDL       Respiratory WDL    Respiratory WDL WDL       Cardiac WDL    Cardiac WDL WDL              "

## 2022-11-29 LAB
ANION GAP SERPL CALCULATED.3IONS-SCNC: 14 MMOL/L (ref 7–15)
BUN SERPL-MCNC: 21.3 MG/DL (ref 6–20)
CALCIUM SERPL-MCNC: 8.8 MG/DL (ref 8.6–10)
CHLORIDE SERPL-SCNC: 104 MMOL/L (ref 98–107)
CMV DNA SPEC NAA+PROBE-ACNC: NOT DETECTED IU/ML
CREAT SERPL-MCNC: 1.02 MG/DL (ref 0.51–0.95)
DEPRECATED HCO3 PLAS-SCNC: 21 MMOL/L (ref 22–29)
EBV DNA # SPEC NAA+PROBE: NOT DETECTED COPIES/ML
ERYTHROCYTE [DISTWIDTH] IN BLOOD BY AUTOMATED COUNT: 14.1 % (ref 10–15)
GFR SERPL CREATININE-BSD FRML MDRD: 67 ML/MIN/1.73M2
GLUCOSE SERPL-MCNC: 91 MG/DL (ref 70–99)
HCT VFR BLD AUTO: 39.5 % (ref 35–47)
HGB BLD-MCNC: 12.8 G/DL (ref 11.7–15.7)
LACTATE SERPL-SCNC: 0.7 MMOL/L (ref 0.7–2)
LIPASE SERPL-CCNC: 68 U/L (ref 13–60)
MAGNESIUM SERPL-MCNC: 1.6 MG/DL (ref 1.7–2.3)
MCH RBC QN AUTO: 27.6 PG (ref 26.5–33)
MCHC RBC AUTO-ENTMCNC: 32.4 G/DL (ref 31.5–36.5)
MCV RBC AUTO: 85 FL (ref 78–100)
PHOSPHATE SERPL-MCNC: 2.6 MG/DL (ref 2.5–4.5)
PLATELET # BLD AUTO: 237 10E3/UL (ref 150–450)
POTASSIUM SERPL-SCNC: 3.2 MMOL/L (ref 3.4–5.3)
RBC # BLD AUTO: 4.64 10E6/UL (ref 3.8–5.2)
SARS-COV-2 RNA RESP QL NAA+PROBE: NEGATIVE
SODIUM SERPL-SCNC: 139 MMOL/L (ref 136–145)
WBC # BLD AUTO: 7.9 10E3/UL (ref 4–11)

## 2022-11-29 PROCEDURE — 36415 COLL VENOUS BLD VENIPUNCTURE: CPT

## 2022-11-29 PROCEDURE — 250N000012 HC RX MED GY IP 250 OP 636 PS 637

## 2022-11-29 PROCEDURE — 85027 COMPLETE CBC AUTOMATED: CPT

## 2022-11-29 PROCEDURE — 83690 ASSAY OF LIPASE: CPT

## 2022-11-29 PROCEDURE — 99232 SBSQ HOSP IP/OBS MODERATE 35: CPT | Mod: FS | Performed by: SURGERY

## 2022-11-29 PROCEDURE — 250N000013 HC RX MED GY IP 250 OP 250 PS 637: Performed by: PHYSICIAN ASSISTANT

## 2022-11-29 PROCEDURE — 83735 ASSAY OF MAGNESIUM: CPT

## 2022-11-29 PROCEDURE — 120N000002 HC R&B MED SURG/OB UMMC

## 2022-11-29 PROCEDURE — 250N000013 HC RX MED GY IP 250 OP 250 PS 637

## 2022-11-29 PROCEDURE — 258N000003 HC RX IP 258 OP 636

## 2022-11-29 PROCEDURE — 84100 ASSAY OF PHOSPHORUS: CPT

## 2022-11-29 PROCEDURE — 80048 BASIC METABOLIC PNL TOTAL CA: CPT

## 2022-11-29 PROCEDURE — 83605 ASSAY OF LACTIC ACID: CPT

## 2022-11-29 PROCEDURE — 250N000012 HC RX MED GY IP 250 OP 636 PS 637: Performed by: SURGERY

## 2022-11-29 RX ORDER — POTASSIUM CHLORIDE 750 MG/1
40 TABLET, EXTENDED RELEASE ORAL ONCE
Status: COMPLETED | OUTPATIENT
Start: 2022-11-29 | End: 2022-11-29

## 2022-11-29 RX ORDER — ALBUTEROL SULFATE 90 UG/1
1-2 AEROSOL, METERED RESPIRATORY (INHALATION) EVERY 4 HOURS PRN
COMMUNITY

## 2022-11-29 RX ORDER — MAGNESIUM OXIDE 400 MG/1
400 TABLET ORAL 2 TIMES DAILY
Status: DISCONTINUED | OUTPATIENT
Start: 2022-11-29 | End: 2022-11-30 | Stop reason: HOSPADM

## 2022-11-29 RX ORDER — PHENTERMINE HYDROCHLORIDE 37.5 MG/1
37.5 CAPSULE ORAL DAILY
COMMUNITY
End: 2023-04-20

## 2022-11-29 RX ORDER — TACROLIMUS 1 MG/1
1 CAPSULE, GELATIN COATED ORAL EVERY EVENING
Status: DISCONTINUED | OUTPATIENT
Start: 2022-11-29 | End: 2022-11-30 | Stop reason: HOSPADM

## 2022-11-29 RX ADMIN — ACETAMINOPHEN 650 MG: 325 TABLET, FILM COATED ORAL at 18:23

## 2022-11-29 RX ADMIN — POTASSIUM CHLORIDE 40 MEQ: 750 TABLET, EXTENDED RELEASE ORAL at 09:34

## 2022-11-29 RX ADMIN — TACROLIMUS 1.5 MG: 1 CAPSULE, GELATIN COATED ORAL at 10:05

## 2022-11-29 RX ADMIN — MYCOPHENOLIC ACID 360 MG: 360 TABLET, DELAYED RELEASE ORAL at 00:06

## 2022-11-29 RX ADMIN — TACROLIMUS 1 MG: 1 CAPSULE, GELATIN COATED ORAL at 00:04

## 2022-11-29 RX ADMIN — TACROLIMUS 1 MG: 1 CAPSULE, GELATIN COATED ORAL at 18:23

## 2022-11-29 RX ADMIN — MYCOPHENOLIC ACID 360 MG: 360 TABLET, DELAYED RELEASE ORAL at 20:29

## 2022-11-29 RX ADMIN — Medication 400 MG: at 09:35

## 2022-11-29 RX ADMIN — SODIUM CHLORIDE, POTASSIUM CHLORIDE, SODIUM LACTATE AND CALCIUM CHLORIDE 1000 ML: 600; 310; 30; 20 INJECTION, SOLUTION INTRAVENOUS at 00:09

## 2022-11-29 RX ADMIN — Medication 400 MG: at 20:29

## 2022-11-29 RX ADMIN — OXYCODONE HYDROCHLORIDE 5 MG: 5 TABLET ORAL at 06:40

## 2022-11-29 RX ADMIN — OXYCODONE HYDROCHLORIDE 5 MG: 5 TABLET ORAL at 12:52

## 2022-11-29 RX ADMIN — MYCOPHENOLIC ACID 360 MG: 360 TABLET, DELAYED RELEASE ORAL at 09:35

## 2022-11-29 RX ADMIN — SODIUM CHLORIDE, POTASSIUM CHLORIDE, SODIUM LACTATE AND CALCIUM CHLORIDE 1000 ML: 600; 310; 30; 20 INJECTION, SOLUTION INTRAVENOUS at 20:00

## 2022-11-29 RX ADMIN — OXYCODONE HYDROCHLORIDE 5 MG: 5 TABLET ORAL at 00:06

## 2022-11-29 RX ADMIN — SODIUM CHLORIDE, POTASSIUM CHLORIDE, SODIUM LACTATE AND CALCIUM CHLORIDE 1000 ML: 600; 310; 30; 20 INJECTION, SOLUTION INTRAVENOUS at 10:05

## 2022-11-29 ASSESSMENT — ACTIVITIES OF DAILY LIVING (ADL)
DOING_ERRANDS_INDEPENDENTLY_DIFFICULTY: NO
WEAR_GLASSES_OR_BLIND: NO
ADLS_ACUITY_SCORE: 35
ADLS_ACUITY_SCORE: 35
DRESSING/BATHING_DIFFICULTY: NO
ADLS_ACUITY_SCORE: 35
NUMBER_OF_TIMES_PATIENT_HAS_FALLEN_WITHIN_LAST_SIX_MONTHS: 1
ADLS_ACUITY_SCORE: 35
CONCENTRATING,_REMEMBERING_OR_MAKING_DECISIONS_DIFFICULTY: NO
DIFFICULTY_EATING/SWALLOWING: NO
ADLS_ACUITY_SCORE: 18
FALL_HISTORY_WITHIN_LAST_SIX_MONTHS: YES
ADLS_ACUITY_SCORE: 35
CHANGE_IN_FUNCTIONAL_STATUS_SINCE_ONSET_OF_CURRENT_ILLNESS/INJURY: NO
ADLS_ACUITY_SCORE: 35
TOILETING_ISSUES: NO
WALKING_OR_CLIMBING_STAIRS_DIFFICULTY: NO

## 2022-11-29 ASSESSMENT — ENCOUNTER SYMPTOMS
SEIZURES: 0
DIFFICULTY URINATING: 0
NAUSEA: 1
ABDOMINAL PAIN: 1
VOMITING: 1
SHORTNESS OF BREATH: 0
HEADACHES: 0
DIARRHEA: 0
COUGH: 0
FEVER: 0
CONFUSION: 0
BACK PAIN: 0
APPETITE CHANGE: 1
EYE REDNESS: 0

## 2022-11-29 NOTE — PROGRESS NOTES
Pt seen at bedside, in which she reports improving abd pain and denies having N/V.    Pt is a 51yo woman with history of SPK in 2008 and large recurrent chronic ventral incisional hernia s/p repair in 2012 who presents with one day of abdominal pain and nausea/vomiting consistent with partial small bowel obstruction.     Physical Exam  General:  NAD, Resting comfortably, Cooperative  Chest:  NWOB on None (Room air), Lungs CTAB, S1, S2  Abdomen:  Obese, large infraumbilical hernia defect with palpable bowel contents that is soft and mildly tender, cannot be reduced.  No rebound tenderness, No guarding.  Extremities:  MAEx4, warm, well perfused    Will continue to monitor.

## 2022-11-29 NOTE — CONSULTS
Transplant Surgery Consult Note  11/28/2022     Date of admission: 11/28/2022    Reason for Consult: bowel obstruction    Consulting Service: ED      Assessment/Plan:  53yo woman with history of SPK in 2008 and large recurrent chronic ventral incisional hernia s/p repair in 2012 who presents with one day of abdominal pain and nausea/vomiting consistent with partial small bowel obstruction. Exam not concerning for acute abdomen. No obvious transition point on CT and patient had small soft BM this morning. Leukocytosis at 13, mildly elevated lipase at 517, Cr elevated at 1.1 from baseline of 1. Will admit for management SBO and workup of potential transplant rejection.     - Admit to transplant surgery, pancreas service  - Pain and nausea control  - NG decompression  - NPO, IVF bolus + maintenance  - Serial abdominal exam by surgery team tonight  - Repeat BMP, CBC, lipase, lactate in AM   - CMV and EBV PCR sent  - Continue immunosuppression medications: tacrolimus, mycophenolic acid       Discussed with transplant fellow Dr. Coyle, who discussed with staff.     Luma Leyva MD  Surgery PGY-2     Attestation: I saw and examined the patient with Kaleb Yesi Verma MD, and the transplant team. I independently reviewed all pertinent laboratory and imaging information and made management decisions including immunosuppression management. I agree with the findings and plan as documented in the note.  Lv Goldman MD  -----------------------------------------------------------  CC: abdominal pain    HPI: 53yo woman with h/o SPK in 2008, reflux pancreatitis of transplant in 2015, and large recurrent chronic ventral incisional hernia s/p repair in 2012 who presents with one day of abdominal pain, nausea and vomiting. RLQ abdominal pain started yesterday, hernia contents felt more bloated and tender. She had multiple episodes of bilious emesis. Today the pain did not improve so she came to the ED. Pain is on the right  side of her hernia, she did feel a bigger lump than before. Pain is somewhat similar in intensity an episode of transplant pancreatitis she had in the past, however it is not in the same location. She did have a small, soft BM this morning, and the pain/bloating/nausea has somewhat improved since arriving in the ED.     No fevers or chills. No chest pain or SOB. No skin changes over abdomen. No other complaints.     ROS: Negative except mentioned above    Past Medical Hx:  Past Medical History:   Diagnosis Date     Acute pancreatitis 6/8/2015     Asthma 11/8/2013     Back pain 7/22/2014     Diabetes (H)     history of diabetes     Dyslipidemia     Resolved after transplant     Encounter for long-term (current) use of medications      History of blood transfusion      Hypertension     Resolved after kidney transplant     Hypokalemia 6/10/2015     Hypomagnesemia 6/10/2015     Kidney replaced by transplant      Pancreas replaced by transplant (H)      Rash     on legs; boils     Reflux pancreatitis of transplanted pancreas 6/10/2015     Renal disease     history of ESRD     Ventral hernia 7/7/2015       Medications:  Prior to Admission medications    Medication Sig Last Dose Taking? Auth Provider Long Term End Date   chlorthalidone (HYGROTEN) 25 MG tablet Take 1 tablet by mouth daily.  Patient not taking: Reported on 12/13/2021   Cj Dutta MD     mycophenolic acid (MYFORTIC - GENERIC EQUIVALENT) 360 MG EC tablet Take 1 tablet (360 mg) by mouth 2 times daily Annual appointment needed for medication refills   Cj Dutta MD Yes    Omega-3 Fatty Acids (OMEGA-3 FISH OIL PO) Take by mouth daily  Patient not taking: Reported on 12/13/2021   Unknown, Entered By History     phentermine 15 MG capsule Take 1 capsule (15 mg) by mouth daily   Corinne Saul PA-C     polyethylene glycol (MIRALAX/GLYCOLAX) packet Take 17 g by mouth daily  Patient not taking: Reported on 12/13/2021   Nedra Saul  HARSHAD Granados     Prenatal Vit-Fe Fumarate-FA (PNV PRENATAL PLUS MULTIVITAMIN) 27-1 MG TABS Take 1 tablet by mouth daily   Nedra Saul PA-C     PROGRAF 0.5 MG PO CAPSULE Take 1 capsule (0.5 mg) by mouth every morning Take with 1 mg capsule. Total dose is 1.5 mg every morning and 1 mg every evening.   Tom Whitley MD     PROGRAF 1 MG PO CAPSULE Take 1 capsule (1 mg) by mouth 2 times daily Take with 0.5 mg capsules. Total dose is 1.5 mg every morning and 1 mg every evening.   Tom Whitley MD     vitamin D3 (CHOLECALCIFEROL) 50 mcg (2000 units) tablet Take 2 tablets by mouth daily   Reported, Patient           Past Surgical Hx:  Past Surgical History:   Procedure Laterality Date     CATARACT IOL, RT/LT        SECTION           HERNIA REPAIR      Dr. Fowler. [facility]     retinal surgery with laser       SOFT TISSUE SURGERY      lipoma     TRANSPLANT      kidney pancreas transplant     VITRECTOMY ANTERIOR           Family Hx:  Family History   Problem Relation Age of Onset     Diabetes Paternal Grandmother          Social Hx:  Social History     Tobacco Use     Smoking status: Never     Smokeless tobacco: Never   Substance Use Topics     Alcohol use: No     Drug use: No         Vitals: Temp: 98.6  F (37  C) Temp src: Temporal BP: 101/69 Pulse: 98   Resp: 16 SpO2: 98 %         Exam:  General: awake, interactive, NAD  Resp: breathing comfortably on room air, no respiratory distress  CV: RR, appears well perfused  GI: abdomen soft, obese, large hernia defect at inferior midline abdomen with palpable bowel that is soft and mildly tender to palpation. Bowel is not reducible due to of defect. No rebound or guarding. Remainder of abdomen is nontender. Midline incision is well healed.   Extremities: warm   Neuro: A&O, cranial nerves grossly intact  Psych: appropriate affect    Labs/Imaging:  Results for orders placed or performed during the hospital encounter of 22 (from  the past 24 hour(s))   Comprehensive metabolic panel   Result Value Ref Range    Sodium 136 136 - 145 mmol/L    Potassium 4.7 3.4 - 5.3 mmol/L    Chloride 99 98 - 107 mmol/L    Carbon Dioxide (CO2) 22 22 - 29 mmol/L    Anion Gap 15 7 - 15 mmol/L    Urea Nitrogen 23.2 (H) 6.0 - 20.0 mg/dL    Creatinine 1.19 (H) 0.51 - 0.95 mg/dL    Calcium 9.7 8.6 - 10.0 mg/dL    Glucose 116 (H) 70 - 99 mg/dL    Alkaline Phosphatase 85 35 - 104 U/L    AST      ALT 18 10 - 35 U/L    Protein Total 8.6 (H) 6.4 - 8.3 g/dL    Albumin 4.0 3.5 - 5.2 g/dL    Bilirubin Total 0.6 <=1.2 mg/dL    GFR Estimate 55 (L) >60 mL/min/1.73m2   Lipase   Result Value Ref Range    Lipase 517 (H) 13 - 60 U/L   Lactic acid whole blood   Result Value Ref Range    Lactic Acid 1.2 0.7 - 2.0 mmol/L   CBC with platelets differential    Narrative    The following orders were created for panel order CBC with platelets differential.  Procedure                               Abnormality         Status                     ---------                               -----------         ------                     CBC with platelets and d...[[id number]]  Abnormal            Final result                 Please view results for these tests on the individual orders.   CBC with platelets and differential   Result Value Ref Range    WBC Count 13.2 (H) 4.0 - 11.0 10e3/uL    RBC Count 5.17 3.80 - 5.20 10e6/uL    Hemoglobin 14.4 11.7 - 15.7 g/dL    Hematocrit 43.4 35.0 - 47.0 %    MCV 84 78 - 100 fL    MCH 27.9 26.5 - 33.0 pg    MCHC 33.2 31.5 - 36.5 g/dL    RDW 14.0 10.0 - 15.0 %    Platelet Count 290 150 - 450 10e3/uL    % Neutrophils 79 %    % Lymphocytes 12 %    % Monocytes 8 %    % Eosinophils 0 %    % Basophils 0 %    % Immature Granulocytes 1 %    NRBCs per 100 WBC 0 <1 /100    Absolute Neutrophils 10.5 (H) 1.6 - 8.3 10e3/uL    Absolute Lymphocytes 1.6 0.8 - 5.3 10e3/uL    Absolute Monocytes 1.0 0.0 - 1.3 10e3/uL    Absolute Eosinophils 0.0 0.0 - 0.7 10e3/uL    Absolute  Basophils 0.0 0.0 - 0.2 10e3/uL    Absolute Immature Granulocytes 0.1 <=0.4 10e3/uL    Absolute NRBCs 0.0 10e3/uL   CT Abdomen Pelvis w/o Contrast    Narrative    Abdomen and pelvis CT without contrast    INDICATION: Abdominal pain at hernia site    COMPARISON: 12/8/2021 outside CT    FINDINGS: No contrast. Included lower chest shows no suspicious  pulmonary opacities.  Within the abdomen and pelvis, noncontrast appearance of the liver  appears unremarkable. Calcified gallstones are present. Spleen and  accessory splenule appear normal. Atrophic native kidneys are present.  Atrophic fatty pancreas noted. Adrenals unremarkable. No enlarged  lymph nodes. Distended fluid-filled small bowel including within large  ventral hernia which appears infraumbilical. This appears grossly  similar to the prior CT. No free fluid. No free air. Aortoiliac  atherosclerotic calcifications.  Degenerative changes in the spine shows mild degenerative change in  the mid lumbar spine. Mild degenerative change in the mid to lower  thoracic spine. No suspicious sclerotic or lytic/destructive lesion.  Left lower quadrant renal transplant without obvious hydronephrosis or  peritransplant fluid collection.      Impression    IMPRESSION: Large infraumbilical ventral hernia containing distended  small bowel. So this is fluid filled and could represent ileus. The  terminal ileum and distal ileum is collapsed. Recommend continued  clinical follow-up to exclude developing obstruction. Left lower  quadrant renal transplant.    SIMON HOLLIDAY MD         SYSTEM ID:  [id number]

## 2022-11-29 NOTE — PHARMACY-ADMISSION MEDICATION HISTORY
Admission Medication History Completed by Pharmacy    See [facility] Admission Navigator for allergy information, preferred outpatient pharmacy, prior to admission medications and immunization status.     Medication History Sources:     Patient    Surescripts    Clinic notes    Changes made to PTA medication list (reason):    Added:   o Ventolin 90 mcg/actuation inhaler    Deleted:   o Omega-3 Fatty Acids (Fish Oil) capsule  o Polyethylene glycol packet     Changed:   o Phentermine 15 mg capsule -> 37.5 mg capsule    Additional Information:    Patient [facility] [place] [facility]    Patient states that she does not currently take pentamidine inhalation for PCP/PJP prophylaxis and that she has no recollection of taking in the past (clinic notes state that the last time she got a dose was 12/2008). When asked if she takes any medication for PCP prophylaxis she stated that she does not/not that she's aware of.     Ronit fills immunosuppression with [facility] and is on BRAND tacrolimus (PROGRAF).    Prior to Admission medications    Medication Sig Last Dose Taking? Auth Provider Long Term End Date   albuterol (PROAIR HFA/PROVENTIL HFA/VENTOLIN HFA) 108 (90 Base) MCG/ACT inhaler Inhale 1-2 puffs into the lungs every 4 hours as needed for wheezing Past Month Yes Unknown, Entered By History Yes    chlorthalidone (HYGROTEN) 25 MG tablet Take 1 tablet by mouth daily. 11/27/2022 Yes Cj Dutta MD     mycophenolic acid (MYFORTIC - GENERIC EQUIVALENT) 360 MG EC tablet Take 1 tablet (360 mg) by mouth 2 times daily Annual appointment needed for medication refills 11/27/2022 Yes Cj Dutta MD Yes    phentermine (ADIPEX-P) 37.5 MG capsule Take 37.5 mg by mouth daily 11/27/2022 Yes Unknown, Entered By History     Prenatal Vit-Fe Fumarate-FA (PNV PRENATAL PLUS MULTIVITAMIN) 27-1 MG TABS Take 1 tablet by mouth daily 11/27/2022  Yes Nedra Saul PA-C     PROGRAF 0.5 MG PO CAPSULE Take 1 capsule (0.5 mg) by mouth every morning Take with 1 mg capsule. Total dose is 1.5 mg every morning and 1 mg every evening. 11/27/2022 Yes Tom Whitley MD     PROGRAF 1 MG PO CAPSULE Take 1 capsule (1 mg) by mouth 2 times daily Take with 0.5 mg capsules. Total dose is 1.5 mg every morning and 1 mg every evening. 11/27/2022 Yes Tom Whitley MD     vitamin D3 (CHOLECALCIFEROL) 50 mcg (2000 units) tablet Take 2 tablets by mouth daily Past Week Yes Reported, Patient         Date completed: 11/29/22    Medication history completed by: Iwona Ty PharmD IV Student/Intern    I have reviewed, discussed and agree with medication history as completed by pharmacy intern.  Maddy Roberts, Pharm.D., BCPS, BCTXP  Pager [phone]

## 2022-11-29 NOTE — PROGRESS NOTES
Transplant Surgery  Inpatient Daily Progress Note  11/29/2022    Assessment & Plan: 51 yo female with PMH significant for DM and HTN s/p SPK in 2008 reflux pancreatitis of transplant in 2015, and large recurrent chronic ventral incisional hernia s/p repair in 2012 who presented with one day of abdominal pain, nausea and vomiting.     Graft function:  Pancreas: Lipase elevated to 517 on admission, decreased to 68 today. Likely reflux pancreatitis with SBO. Euglycemic.   Kidney: VERNA on admission due to dehydration. Cr 1.0 from 1.2.   Immunosuppression management:    Tac 1.5 every AM and 1 mg every PM. Goal 5-8. Will check level tomorrow.   Myfortic 360 mg BID  Complexity of management:Medium. Contributing factors: bowel obstruction  Hematology:   Anemia: HGB 12.8 from 14.4 due to dilution  Leukocytosis: WBC 13.2 on admission, now down to normal limits  Cardiorespiratory: Stable on room air    GI/Nutrition:   PSBO: Abdominal pain and emesis on admission. CT without transition point. NG ordered but patient refused placement. Positive bowel function. Advance to FLD today with possible advancement to regular diet this evening.   Chronic ventral hernia: Advised 30 pound weight loss during recent clinic appointment prior to surgical repair of ventral hernia. Will make referral to bariatric surgery to see inpatient vs outpatient.   Endocrine: Euglycemic   Fluid/Electrolytes:   MIVF: LR@100/hr due to dehydration on admission, will discontinue when tolerating oral intake.   Hypokalemia: Give 40 mEq  Hypomagnesemia: 400 mg BID  : Voiding.   Infectious disease: AF. Leukocytosis on admission now resolved. CMV and EBV pending.   Prophylaxis: DVT mechanical, fall  Disposition: ED, transfer to . Possible discharge tomorrow.     Medical Decision Making: Medium  Subsequent visit 10327 (moderate level decision making)    CA/Fellow/Resident Provider: Chhaya Ramirez PA-C [id number]    Faculty: Lv Goldman MD     Attestation: I saw  "and examined the patient with Chhaya Ramirez PA-C, and the transplant team. I independently reviewed all pertinent laboratory and imaging information and made management decisions including immunosuppression management. I agree with the findings and plan as documented in the note.  Lv Goldman MD  _________________________________________________________________  Transplant History: Admitted 11/28/2022 for PSBO.  10/18/2008 (Kidney / Pancreas), Postoperative day: 5155     Interval History: History is obtained from the patient  Overnight events: Pain improving. Positive bowel function yesterday.     ROS:   A 10-point review of systems was negative except as noted above.    Meds:    LORazepam  1 mg Intravenous Once     magnesium oxide  400 mg Oral BID     mycophenolic acid  360 mg Oral BID     tacrolimus  1 mg Oral QPM     tacrolimus  1.5 mg Oral QAM       Physical Exam:     Admit Weight: 99.8 kg (220 lb)    Current vitals:   /70   Pulse 89   Temp 98.6  F (37  C) (Temporal)   Resp 18   Ht 1.651 m (5' 5\")   Wt 99.8 kg (220 lb)   SpO2 95%   BMI 36.61 kg/m        Vital sign ranges:    Temp:  [98.6  F (37  C)] 98.6  F (37  C)  Pulse:  [89-98] 89  Resp:  [16-18] 18  BP: (101-131)/(69-81) 102/70  SpO2:  [93 %-98 %] 95 %  Patient Vitals for the past 24 hrs:   BP Temp Temp src Pulse Resp SpO2 Height Weight   11/29/22 0558 102/70 -- -- 89 -- 95 % -- --   11/28/22 2330 126/69 -- -- 92 -- 93 % -- --   11/28/22 2030 131/75 -- -- 92 -- -- -- --   11/28/22 1943 118/81 -- -- 91 18 97 % -- --   11/28/22 1414 101/69 98.6  F (37  C) Temporal 98 16 98 % 1.651 m (5' 5\") 99.8 kg (220 lb)     General Appearance: NAD  Skin: normal  Heart: regular rate and rhythm  Lungs: NLB on room air  Abdomen: The abdomen is obese and distended, and  moderately tender, generalized. she is not tender over the kidney and pancreas graft. The wound is Healing well. Ventral hernia.   : apodaca is not present.    Extremities: edema: absent. "   Neurologic: awake, alert and oriented. Tremor absent.    Data:   CMP  Recent Labs   Lab 11/29/22  0603 11/28/22  1821    136   POTASSIUM 3.2* 4.7   CHLORIDE 104 99   CO2 21* 22   GLC 91 116*   BUN 21.3* 23.2*   CR 1.02* 1.19*   GFRESTIMATED 67 55*   NEELAM 8.8 9.7   MAG 1.6*  --    PHOS 2.6  --    LIPASE 68* 517*   ALBUMIN  --  4.0   BILITOTAL  --  0.6   ALKPHOS  --  85   ALT  --  18     CBC  Recent Labs   Lab 11/29/22  0603 11/28/22  1821   HGB 12.8 14.4   WBC 7.9 13.2*    290     COAGSNo lab results found in last 7 days.    Invalid input(s): XA   Urinalysis  Recent Labs   Lab Test 06/08/15  2240 01/23/15  1340   COLOR Yellow Light Yellow   APPEARANCE Clear Clear   URINEGLC Negative Negative   URINEBILI Negative Negative   URINEKETONE 10* Negative   SG 1.025 1.019   UBLD Negative Moderate*   URINEPH 5.0 5.0   PROTEIN 10* Negative   NITRITE Negative Negative   LEUKEST Negative Negative   RBCU 1 <1   WBCU 1 <1     Virology:  CMV DNA Quantitation Specimen   Date Value Ref Range Status   06/09/2015 EDTA PLASMA  Final     CMV Quantitative   Date Value Ref Range Status   01/09/2009 <100 <100 Copies/mL Final     Comment:     No CMV DNA detected.     CMV IgG Antibody   Date Value Ref Range Status   10/18/2008 0.4 EU/mL Final     Comment:     Negative for anti-CMV IgG     Hepatitis C Antibody   Date Value Ref Range Status   01/11/2009 Negative NEG Final     Hep B Surface Serene   Date Value Ref Range Status   01/11/2009 8.0  Final     Comment:     Indeterminate, Unable to determine if anti-HBs (hepatitis B surface antibody)   is   present at levels consistent with immunity when the value is between 5.0 and   11.9 mlU/mL.     BK Virus Result   Date Value Ref Range Status   09/27/2013 <1000 <1000 copies/mL Final   12/13/2011 <1000 <1000 copies/mL Final   04/13/2009 <1000 <1000 copies/mL Final

## 2022-11-29 NOTE — PROVIDER NOTIFICATION
Surgery pancreas transplant fellow text paged:    Pt needs potassium/mag replacement orders and pt is requesting increased dose for pain meds.

## 2022-11-29 NOTE — ED PROVIDER NOTES
ED Provider Note  [facility]      History     Chief Complaint   Patient presents with     Abdominal Pain     HPI  Ronit Rodrigues is a 52 year old female who presents to the emergency department with severe abdominal discomfort, distention, nausea, bilious vomiting, unable to pass gas, and concern for strangulated hernia since yesterday.  She describes the pain as sharp and located in a bandlike distribution where she has herniated bowel across her inferior abdomen.  Patient has complex surgical history and is status post kidney pancreas transplant.  She has had a large ventral hernia and previously has had a partial bowel obstruction.  She reports that she is additionally feeling dehydrated.  No fevers or chills.  No change in urination.  No chest pain or shortness of breath.    Past Medical History  Past Medical History:   Diagnosis Date     Acute pancreatitis 2015     Asthma 2013     Back pain 2014     Diabetes (H)     history of diabetes     Dyslipidemia     Resolved after transplant     Encounter for long-term (current) use of medications      History of blood transfusion      Hypertension     Resolved after kidney transplant     Hypokalemia 6/10/2015     Hypomagnesemia 6/10/2015     Kidney replaced by transplant      Pancreas replaced by transplant (H)      Rash     on legs; boils     Reflux pancreatitis of transplanted pancreas 6/10/2015     Renal disease     history of ESRD     Ventral hernia 2015     Past Surgical History:   Procedure Laterality Date     CATARACT IOL, RT/LT        SECTION           HERNIA REPAIR      Dr. Fowler. [facility]     retinal surgery with laser       SOFT TISSUE SURGERY      lipoma     TRANSPLANT  2008    kidney pancreas transplant     VITRECTOMY ANTERIOR       chlorthalidone (HYGROTEN) 25 MG tablet  mycophenolic acid (MYFORTIC - GENERIC EQUIVALENT) 360 MG EC tablet  Omega-3 Fatty Acids (OMEGA-3 FISH OIL  "PO)  phentermine 15 MG capsule  polyethylene glycol (MIRALAX/GLYCOLAX) packet  Prenatal Vit-Fe Fumarate-FA (PNV PRENATAL PLUS MULTIVITAMIN) 27-1 MG TABS  PROGRAF 0.5 MG PO CAPSULE  PROGRAF 1 MG PO CAPSULE  vitamin D3 (CHOLECALCIFEROL) 50 mcg (2000 units) tablet      Allergies   Allergen Reactions     Contrast Dye Hives     Family History  Family History   Problem Relation Age of Onset     Diabetes Paternal Grandmother      Social History   Social History     Tobacco Use     Smoking status: Never     Smokeless tobacco: Never   Substance Use Topics     Alcohol use: No     Drug use: No      Past medical history, past surgical history, medications, allergies, family history, and social history were reviewed with the patient. No additional pertinent items.       Review of Systems   Constitutional: Positive for appetite change. Negative for fever.   HENT: Negative for congestion.    Eyes: Negative for redness.   Respiratory: Negative for cough and shortness of breath.    Cardiovascular: Negative for chest pain.   Gastrointestinal: Positive for abdominal pain, nausea and vomiting. Negative for diarrhea.   Genitourinary: Negative for difficulty urinating.   Musculoskeletal: Negative for back pain.   Skin: Negative for rash.   Neurological: Negative for seizures and headaches.   Psychiatric/Behavioral: Negative for confusion.     A complete review of systems was performed with pertinent positives and negatives noted in the HPI, and all other systems negative.    Physical Exam   BP: 101/69  Pulse: 98  Temp: 98.6  F (37  C)  Resp: 16  Height: 165.1 cm (5' 5\")  Weight: 99.8 kg (220 lb)  SpO2: 98 %  Physical Exam  Constitutional:       General: She is awake. She is in acute distress.      Appearance: Normal appearance. She is well-developed. She is ill-appearing. She is not toxic-appearing.   HENT:      Head: Atraumatic.      Mouth/Throat:      Pharynx: No oropharyngeal exudate.   Eyes:      General: No scleral icterus.     " Pupils: Pupils are equal, round, and reactive to light.   Cardiovascular:      Rate and Rhythm: Normal rate and regular rhythm.      Heart sounds: Normal heart sounds, S1 normal and S2 normal.   Pulmonary:      Effort: No respiratory distress.      Breath sounds: Normal breath sounds.   Abdominal:      General: Bowel sounds are absent.      Palpations: Abdomen is soft.      Tenderness: There is abdominal tenderness in the right lower quadrant, periumbilical area and left lower quadrant.      Hernia: A hernia is present. Hernia is present in the ventral area.      Comments: Well-healed surgical scars, large ventral hernia with horizontal appearing mass in the lower quadrants of the abdomen.  No bowel sounds heard.  Tender to palpation at that region.   Musculoskeletal:         General: No tenderness.   Skin:     General: Skin is warm.      Findings: No rash.   Neurological:      Mental Status: She is alert and oriented to person, place, and time.   Psychiatric:         Attention and Perception: Attention normal.         Mood and Affect: Mood is anxious. Affect is tearful.         Speech: Speech normal.         Behavior: Behavior normal. Behavior is cooperative.         ED Course      Procedures                     Results for orders placed or performed during the hospital encounter of 11/28/22   CT Abdomen Pelvis w/o Contrast     Status: None    Narrative    Abdomen and pelvis CT without contrast    INDICATION: Abdominal pain at hernia site    COMPARISON: 12/8/2021 outside CT    FINDINGS: No contrast. Included lower chest shows no suspicious  pulmonary opacities.  Within the abdomen and pelvis, noncontrast appearance of the liver  appears unremarkable. Calcified gallstones are present. Spleen and  accessory splenule appear normal. Atrophic native kidneys are present.  Atrophic fatty pancreas noted. Adrenals unremarkable. No enlarged  lymph nodes. Distended fluid-filled small bowel including within large  ventral  hernia which appears infraumbilical. This appears grossly  similar to the prior CT. No free fluid. No free air. Aortoiliac  atherosclerotic calcifications.  Degenerative changes in the spine shows mild degenerative change in  the mid lumbar spine. Mild degenerative change in the mid to lower  thoracic spine. No suspicious sclerotic or lytic/destructive lesion.  Left lower quadrant renal transplant without obvious hydronephrosis or  peritransplant fluid collection.      Impression    IMPRESSION: Large infraumbilical ventral hernia containing distended  small bowel. So this is fluid filled and could represent ileus. The  terminal ileum and distal ileum is collapsed. Recommend continued  clinical follow-up to exclude developing obstruction. Left lower  quadrant renal transplant.    SIMON HOLLIDAY MD         SYSTEM ID:  [id number]   Comprehensive metabolic panel     Status: Abnormal   Result Value Ref Range    Sodium 136 136 - 145 mmol/L    Potassium 4.7 3.4 - 5.3 mmol/L    Chloride 99 98 - 107 mmol/L    Carbon Dioxide (CO2) 22 22 - 29 mmol/L    Anion Gap 15 7 - 15 mmol/L    Urea Nitrogen 23.2 (H) 6.0 - 20.0 mg/dL    Creatinine 1.19 (H) 0.51 - 0.95 mg/dL    Calcium 9.7 8.6 - 10.0 mg/dL    Glucose 116 (H) 70 - 99 mg/dL    Alkaline Phosphatase 85 35 - 104 U/L    AST      ALT 18 10 - 35 U/L    Protein Total 8.6 (H) 6.4 - 8.3 g/dL    Albumin 4.0 3.5 - 5.2 g/dL    Bilirubin Total 0.6 <=1.2 mg/dL    GFR Estimate 55 (L) >60 mL/min/1.73m2   Lipase     Status: Abnormal   Result Value Ref Range    Lipase 517 (H) 13 - 60 U/L   Lactic acid whole blood     Status: Normal   Result Value Ref Range    Lactic Acid 1.2 0.7 - 2.0 mmol/L   CBC with platelets and differential     Status: Abnormal   Result Value Ref Range    WBC Count 13.2 (H) 4.0 - 11.0 10e3/uL    RBC Count 5.17 3.80 - 5.20 10e6/uL    Hemoglobin 14.4 11.7 - 15.7 g/dL    Hematocrit 43.4 35.0 - 47.0 %    MCV 84 78 - 100 fL    MCH 27.9 26.5 - 33.0 pg    MCHC 33.2 31.5 -  36.5 g/dL    RDW 14.0 10.0 - 15.0 %    Platelet Count 290 150 - 450 10e3/uL    % Neutrophils 79 %    % Lymphocytes 12 %    % Monocytes 8 %    % Eosinophils 0 %    % Basophils 0 %    % Immature Granulocytes 1 %    NRBCs per 100 WBC 0 <1 /100    Absolute Neutrophils 10.5 (H) 1.6 - 8.3 10e3/uL    Absolute Lymphocytes 1.6 0.8 - 5.3 10e3/uL    Absolute Monocytes 1.0 0.0 - 1.3 10e3/uL    Absolute Eosinophils 0.0 0.0 - 0.7 10e3/uL    Absolute Basophils 0.0 0.0 - 0.2 10e3/uL    Absolute Immature Granulocytes 0.1 <=0.4 10e3/uL    Absolute NRBCs 0.0 10e3/uL   CBC with platelets differential     Status: Abnormal    Narrative    The following orders were created for panel order CBC with platelets differential.  Procedure                               Abnormality         Status                     ---------                               -----------         ------                     CBC with platelets and d...[[id number]]  Abnormal            Final result                 Please view results for these tests on the individual orders.     Medications   LORazepam (ATIVAN) injection 1 mg (has no administration in time range)   mycophenolic acid (GENERIC EQUIVALENT) EC tablet 360 mg (360 mg Oral Given 11/29/22 0006)   tacrolimus (PROGRAF BRAND) capsule 0.5 mg (has no administration in time range)   tacrolimus (PROGRAF BRAND) capsule 1 mg (1 mg Oral Given 11/29/22 0004)   lactated ringers infusion (1,000 mLs Intravenous New Bag 11/29/22 0009)   acetaminophen (TYLENOL) tablet 650 mg (has no administration in time range)   oxyCODONE (ROXICODONE) tablet 5 mg (5 mg Oral Given 11/29/22 0006)   ondansetron (ZOFRAN ODT) ODT tab 4 mg (has no administration in time range)     Or   ondansetron (ZOFRAN) injection 4 mg (has no administration in time range)   prochlorperazine (COMPAZINE) injection 10 mg (has no administration in time range)     Or   prochlorperazine (COMPAZINE) tablet 10 mg (has no administration in time range)     Or    prochlorperazine (COMPAZINE) suppository 25 mg (has no administration in time range)   HYDROmorphone (DILAUDID) injection 1 mg (1 mg Intramuscular Given 11/28/22 1828)   0.9% sodium chloride BOLUS (0 mLs Intravenous Stopped 11/28/22 2047)        Assessments & Plan (with Medical Decision Making)   Ronit Rodrigues is a 52 year old female who presents to the emergency department with severe abdominal discomfort, distention, nausea, bilious vomiting, unable to pass gas, and concern for strangulated hernia since yesterday.  History and exam are indeed concerning for an intestinal obstruction, no evidence clinically that her hernia is strangulated however.  She could have a pancreatitis flare as well.  Will obtain labs, CT scan.  Anticipate admission, will give pain control and antiemetic.  Will give IV fluids.     I have reviewed the nursing notes. I have reviewed the findings, diagnosis, plan and need for follow up with the patient.    Labs with elevation in lipase, CT scan with concern for ileus versus obstruction.  Clinically, the patient appears obstructed so this is my biggest concern.  Surgery consulted and they are recommending NG tube.  They suspect that the obstruction is likely leading to some pancreatic congestion elevation in the lipase.  I discussed this and why an NG tube would be helpful, however the patient initially was resistant.  Ultimately she agreed to attempt placement, however she did not tolerate the placement according to the nurse.  I requested general surgery discussed with her as well the need for NG tube placement, however the patient was insistent on avoiding it.  Will admit to the transplant surgery service.    New Prescriptions    No medications on file       Final diagnoses:   SBO (small bowel obstruction) (H)   Acute pancreatitis, unspecified complication status, unspecified pancreatitis type       --  Hansel Chen MD PhD  [facility] EMERGENCY DEPARTMENT  11/28/2022     Gustavo  MD Eleuterio  11/29/22 0115

## 2022-11-29 NOTE — ED NOTES
Pt educated by Dr. Chen on why NG tube placement is important. Pt stated she was willing to try NG tube. The NG tube was inserted 1 inch, pt said it was 10/10 pain & refused to let RN advance.Pt did not tolerate the insertion & is unwilling to try again.

## 2022-11-29 NOTE — DISCHARGE INSTRUCTIONS
General and Minimally Invasive Surgery Clinic  - You should receive a call from one of our General Surgery Nurses in the next 24-48hrs after your discharge from hospital. If you do not hear from us, please call the General Surgery Clinic at [phone] for any questions or to schedule an appointment.    Clinic address:  [address] [place] [address]   [id number]

## 2022-11-30 VITALS
SYSTOLIC BLOOD PRESSURE: 103 MMHG | DIASTOLIC BLOOD PRESSURE: 64 MMHG | RESPIRATION RATE: 18 BRPM | BODY MASS INDEX: 36.65 KG/M2 | WEIGHT: 220 LBS | OXYGEN SATURATION: 99 % | HEIGHT: 65 IN | HEART RATE: 85 BPM | TEMPERATURE: 98.3 F

## 2022-11-30 PROBLEM — N17.9 AKI (ACUTE KIDNEY INJURY) (H): Status: ACTIVE | Noted: 2022-11-30

## 2022-11-30 PROBLEM — D84.9 IMMUNOSUPPRESSED STATUS (H): Status: ACTIVE | Noted: 2022-11-30

## 2022-11-30 PROBLEM — K85.80: Status: ACTIVE | Noted: 2022-11-30

## 2022-11-30 LAB
ANION GAP SERPL CALCULATED.3IONS-SCNC: 12 MMOL/L (ref 7–15)
BUN SERPL-MCNC: 15.5 MG/DL (ref 6–20)
CALCIUM SERPL-MCNC: 8.8 MG/DL (ref 8.6–10)
CHLORIDE SERPL-SCNC: 103 MMOL/L (ref 98–107)
CREAT SERPL-MCNC: 0.88 MG/DL (ref 0.51–0.95)
DEPRECATED HCO3 PLAS-SCNC: 22 MMOL/L (ref 22–29)
ERYTHROCYTE [DISTWIDTH] IN BLOOD BY AUTOMATED COUNT: 13.8 % (ref 10–15)
GFR SERPL CREATININE-BSD FRML MDRD: 80 ML/MIN/1.73M2
GLUCOSE SERPL-MCNC: 94 MG/DL (ref 70–99)
HCT VFR BLD AUTO: 38.4 % (ref 35–47)
HGB BLD-MCNC: 12.5 G/DL (ref 11.7–15.7)
LIPASE SERPL-CCNC: 20 U/L (ref 13–60)
MAGNESIUM SERPL-MCNC: 1.5 MG/DL (ref 1.7–2.3)
MCH RBC QN AUTO: 27.7 PG (ref 26.5–33)
MCHC RBC AUTO-ENTMCNC: 32.6 G/DL (ref 31.5–36.5)
MCV RBC AUTO: 85 FL (ref 78–100)
PHOSPHATE SERPL-MCNC: 2.2 MG/DL (ref 2.5–4.5)
PLATELET # BLD AUTO: 246 10E3/UL (ref 150–450)
POTASSIUM SERPL-SCNC: 3.5 MMOL/L (ref 3.4–5.3)
RBC # BLD AUTO: 4.52 10E6/UL (ref 3.8–5.2)
SODIUM SERPL-SCNC: 137 MMOL/L (ref 136–145)
TACROLIMUS BLD-MCNC: 4.7 UG/L (ref 5–15)
TME LAST DOSE: ABNORMAL H
TME LAST DOSE: ABNORMAL H
WBC # BLD AUTO: 9.6 10E3/UL (ref 4–11)

## 2022-11-30 PROCEDURE — 250N000013 HC RX MED GY IP 250 OP 250 PS 637: Performed by: PHYSICIAN ASSISTANT

## 2022-11-30 PROCEDURE — 83690 ASSAY OF LIPASE: CPT | Performed by: PHYSICIAN ASSISTANT

## 2022-11-30 PROCEDURE — 83735 ASSAY OF MAGNESIUM: CPT | Performed by: PHYSICIAN ASSISTANT

## 2022-11-30 PROCEDURE — 84100 ASSAY OF PHOSPHORUS: CPT | Performed by: PHYSICIAN ASSISTANT

## 2022-11-30 PROCEDURE — 85027 COMPLETE CBC AUTOMATED: CPT | Performed by: PHYSICIAN ASSISTANT

## 2022-11-30 PROCEDURE — 36415 COLL VENOUS BLD VENIPUNCTURE: CPT | Performed by: PHYSICIAN ASSISTANT

## 2022-11-30 PROCEDURE — 250N000012 HC RX MED GY IP 250 OP 636 PS 637: Performed by: SURGERY

## 2022-11-30 PROCEDURE — 250N000013 HC RX MED GY IP 250 OP 250 PS 637: Performed by: STUDENT IN AN ORGANIZED HEALTH CARE EDUCATION/TRAINING PROGRAM

## 2022-11-30 PROCEDURE — 250N000012 HC RX MED GY IP 250 OP 636 PS 637

## 2022-11-30 PROCEDURE — 80048 BASIC METABOLIC PNL TOTAL CA: CPT | Performed by: PHYSICIAN ASSISTANT

## 2022-11-30 PROCEDURE — 99238 HOSP IP/OBS DSCHRG MGMT 30/<: CPT | Mod: FS | Performed by: SURGERY

## 2022-11-30 PROCEDURE — 250N000013 HC RX MED GY IP 250 OP 250 PS 637

## 2022-11-30 PROCEDURE — 80197 ASSAY OF TACROLIMUS: CPT | Performed by: PHYSICIAN ASSISTANT

## 2022-11-30 RX ORDER — METHOCARBAMOL 500 MG/1
500 TABLET, FILM COATED ORAL 4 TIMES DAILY PRN
Status: DISCONTINUED | OUTPATIENT
Start: 2022-11-30 | End: 2022-11-30 | Stop reason: HOSPADM

## 2022-11-30 RX ORDER — MAGNESIUM OXIDE 400 MG/1
400 TABLET ORAL 2 TIMES DAILY
Qty: 30 TABLET | Refills: 1 | Status: SHIPPED | OUTPATIENT
Start: 2022-11-30 | End: 2023-04-20

## 2022-11-30 RX ORDER — LIDOCAINE 4 G/G
2 PATCH TOPICAL
Status: DISCONTINUED | OUTPATIENT
Start: 2022-11-30 | End: 2022-11-30 | Stop reason: HOSPADM

## 2022-11-30 RX ORDER — SODIUM CHLORIDE, SODIUM LACTATE, POTASSIUM CHLORIDE, CALCIUM CHLORIDE 600; 310; 30; 20 MG/100ML; MG/100ML; MG/100ML; MG/100ML
INJECTION, SOLUTION INTRAVENOUS
Status: DISCONTINUED
Start: 2022-11-30 | End: 2022-11-30 | Stop reason: HOSPADM

## 2022-11-30 RX ORDER — POTASSIUM CHLORIDE 750 MG/1
40 TABLET, EXTENDED RELEASE ORAL ONCE
Status: COMPLETED | OUTPATIENT
Start: 2022-11-30 | End: 2022-11-30

## 2022-11-30 RX ADMIN — Medication 400 MG: at 07:31

## 2022-11-30 RX ADMIN — TACROLIMUS 1.5 MG: 1 CAPSULE, GELATIN COATED ORAL at 07:31

## 2022-11-30 RX ADMIN — MYCOPHENOLIC ACID 360 MG: 360 TABLET, DELAYED RELEASE ORAL at 07:31

## 2022-11-30 RX ADMIN — METHOCARBAMOL 500 MG: 500 TABLET ORAL at 07:01

## 2022-11-30 RX ADMIN — SODIUM PHOSPHATE, DIBASIC, ANHYDROUS, POTASSIUM PHOSPHATE, MONOBASIC, AND SODIUM PHOSPHATE, MONOBASIC, MONOHYDRATE 250 MG: 852; 155; 130 TABLET, COATED ORAL at 10:02

## 2022-11-30 RX ADMIN — POTASSIUM CHLORIDE 40 MEQ: 750 TABLET, EXTENDED RELEASE ORAL at 10:02

## 2022-11-30 RX ADMIN — ACETAMINOPHEN 650 MG: 325 TABLET, FILM COATED ORAL at 08:51

## 2022-11-30 ASSESSMENT — ACTIVITIES OF DAILY LIVING (ADL)
ADLS_ACUITY_SCORE: 18

## 2022-11-30 NOTE — TELEPHONE ENCOUNTER
REFERRAL INFORMATION:    Referring Provider:     Referring Clinic:      Reason for Visit/Diagnosis: seen in 2016 for ventral hernia        FUTURE VISIT INFORMATION:    Appointment Date: 12/21/2022    Appointment Time: 8 AM      NOTES RECORD STATUS  DETAILS   OFFICE NOTE from Referring Provider N/A    OFFICE NOTE from Other Specialists Internal / Care Everywhere 9/21/2022 Office visit with Lupe Meyers PA-C ([facility])     5/5/2022, 2/1/2022,8/1/19 Office visit with Dr. Miquel Fowler ([facility])     3/8/2022, 6/8/2021 Office visit with Dr. Skyla Srinivasan ([facility])     12/13/2021 Office visit with Dr. Lv Goldman ([facility])     12/6/2021 Office visit with Dr. James Johanson ([facility])    4/6/16 Office visit with Dr. Tarango (General Surgery)     4/6/16 Office visit with Dr. Rashad Sorto (General Surgery)      HOSPITAL DISCHARGE SUMMARY/ ED VISITS  N/A    OPERATIVE REPORT N/A    ENDOSCOPY (EGD)  Internal 11/11/08   PERTINENT LABS Internal/ Care Everywhere    PATHOLOGY REPORTS (RELATED) N/A    IMAGING (CT, MRI, US, XR)  Internal CT Abdomen Pelvis: 11/28/2022, 6/8/15

## 2022-11-30 NOTE — PROVIDER NOTIFICATION
"Provider notified: \"Pt expressing much frustration in regards to pain management, would like something stronger for pain besides Tylenol, please advise.\"  "

## 2022-11-30 NOTE — PROGRESS NOTES
Pt refused abdominal assessment today despite c/o persistent abdominal pain. Continued to re-approach with no success

## 2022-11-30 NOTE — PLAN OF CARE
"Goal Outcome Evaluation:      Plan of Care Reviewed With: patient    Overall Patient Progress: no change    Shift: 3450-6846  VS: /67 (BP Location: Right arm)   Pulse 84   Temp 98.7  F (37.1  C) (Oral)   Resp 16   Ht 1.651 m (5' 5\")   Wt 99.8 kg (220 lb)   SpO2 99%   BMI 36.61 kg/m    Pain: Pt refused Tylenol for pain b/c no effect - provider ordered Robaxin and pt also refused. Currently in pain but is \"fine\".  Neuro: WDL  Cardiac: WDL - denies chest pain  Respiratory: Lungs clear bilaterally - anterior/lateral   Diet/Appetite: Reg diet - fair appetite.  /GI: Passing flatus but no BM this shift.  LDA's: Left PIV - LR infusing @ 100 ml/hr  Skin: WDL  Activity: Up ad rey - independent  Procedures: N/A  Pertinent Labs/: K+ and Mag slightly low - AM labs being drawn.     Plan: Continue to monitor and follow POC.  "

## 2022-11-30 NOTE — PROVIDER NOTIFICATION
Transplant surgery paged 4th time;   Nurse: Rosemarie ANDERS Obs Charge  Patient:Lilia Cottrell Obs 7   4th page: Pt states her pain remains uncontrolled since yesterday. Can she get stronger pain medication? Thank you .

## 2022-11-30 NOTE — PROGRESS NOTES
Pt's peripheral IV difficult to flush (20g in L AC) , was able to get blood return, redressed; asked pt if she was okay with vascular access coming to trouble shoot the IV, but pt refused, said she would not get another IV. Writer explained that its important to have a working IV, but pt feels she doesn't need it. Will continue to approach.

## 2022-11-30 NOTE — PROVIDER NOTIFICATION
Provider paged re:  Pt's Mag is 1.5, not on RN managed replacement protocol, do we need replacements?

## 2022-11-30 NOTE — PROGRESS NOTES
Shift: 4055-8972  VS: VSS on RA  Pain: C/o RLQ pain, given oxy/tylenol with slight relief  Neuro: A/Ox4  Cardiac: WNL  Respiratory: WNL  Diet/Appetite:  Advanced diet to regular for evening meal, tolerating  /GI: Pt report passing flatus, no BM today  LDA's: LR running into L PIV  Activity: IND  Pertinent Labs/Lab Collection: K:3.2, replaced, ma.6, replaced     Plan:  Pain management

## 2022-11-30 NOTE — PROVIDER NOTIFICATION
"Provider notified: \"Pt irritated and upset about wanting pain medications. States that Tylenol isn't working and would like something else.\"  "

## 2022-11-30 NOTE — DISCHARGE SUMMARY
[facility]    Discharge Summary  Transplant Surgery    Date of Admission:  11/28/2022  Date of Discharge:  11/30/2022  Discharging Provider: Chhaya Ramirez PA-C, Lv Goldman MD    Attestation: I saw and examined the patient with Chhaya Ramirez PA-C, and the transplant team. I independently reviewed all pertinent laboratory and imaging information and made management decisions including immunosuppression management. I agree with the findings and plan as documented in the note.  Lv Goldman MD    Discharge Diagnoses   Principal Problem:    Acute pancreatitis, unspecified complication status, unspecified pancreatitis type  Active Problems:    Dehydration    Hypomagnesemia    Hypokalemia    Ventral hernia    SBO (small bowel obstruction) (H)    VERNA (acute kidney injury) (H)    Reflux pancreatitis    Immunosuppressed status (H)      History of Present Illness   Ronit Rodrigues is an 52 year old female with PMH significant for DM and HTN s/p SPK in 2008 reflux pancreatitis of transplant in 2015, and large recurrent chronic ventral incisional hernia s/p repair in 2012 who presented with one day of abdominal pain, nausea and vomiting.     Hospital Course   51 yo female with PMH significant for DM and HTN s/p SPK in 2008 reflux pancreatitis of transplant in 2015, and large recurrent chronic ventral incisional hernia s/p repair in 2012 who presented with one day of abdominal pain, nausea and vomiting.      Graft function:  Pancreas: Lipase elevated to 517 on admission, decreased to 20 on discharge. Likely reflux pancreatitis with SBO. Euglycemic.   Kidney: VERNA on admission due to dehydration. Cr 0.9 from 1.2.     Immunosuppression management:    Tac 1.5 every AM and 1 mg every PM. Goal 5-8. 11/30 level 4.7 (12 hour trough), no change.   Myfortic 360 mg BID    Hematology:   Anemia: HGB 12.5 from 14.4 due to dilution  Leukocytosis: WBC 13.2 on admission, now down to  normal limits    GI/Nutrition:   PSBO: Abdominal pain and emesis on admission. CT without transition point. NG ordered but patient refused placement. Positive bowel function. Tolerating regular diet prior to discharge.   Chronic ventral hernia: Advised 30 pound weight loss during recent clinic appointment prior to surgical repair of ventral hernia. Discussed with bariatric surgery and they scheduled an appointment for outpatient follow up.     Endocrine: Euglycemic independent of insulin    Fluid/Electrolytes:   MIVF: LR@100/hr due to dehydration on admission, discontinued when tolerating oral intake.   Hypokalemia: Replaced  Hypomagnesemia: started 400 mg BID  Hypophosphatemia: Replaced    Infectious disease: AF. Leukocytosis on admission now resolved. CMV and EBV negative.     Prophylaxis: DVT mechanical, fall    Significant Results and Procedures   CT ABD/PELVIS 11/28/22:   Large infraumbilical ventral hernia containing distended  small bowel. So this is fluid filled and could represent ileus. The  terminal ileum and distal ileum is collapsed. Recommend continued  clinical follow-up to exclude developing obstruction. Left lower  quadrant renal transplant.    Pending Results   These results will be followed up by transplant coordinator  Unresulted Labs Ordered in the Past 30 Days of this Admission     No orders found from 10/29/2022 to 11/29/2022.          Code Status   Full    Primary Care Physician   Skyla Srinivasan    Physical Exam   Temp: 98.3  F (36.8  C) Temp src: Oral BP: 103/64 Pulse: 85   Resp: 18 SpO2: 99 % O2 Device: None (Room air)    Vitals:    11/28/22 1414   Weight: 99.8 kg (220 lb)     Vital Signs with Ranges  Temp:  [98.3  F (36.8  C)-99.2  F (37.3  C)] 98.3  F (36.8  C)  Pulse:  [84-90] 85  Resp:  [16-20] 18  BP: (103-133)/(55-67) 103/64  SpO2:  [96 %-99 %] 99 %  No intake/output data recorded.    General Appearance: NAD  Skin: normal  Heart: regular rate and rhythm  Lungs: NLB on room  air  Abdomen: The abdomen is obese and distended, and  moderately tender, generalized. she is not tender over the kidney and pancreas graft. The wound is Healing well. Ventral hernia.   : apodaca is not present.    Extremities: edema: absent.   Neurologic: awake, alert and oriented. Tremor absent.    Time Spent on this Encounter   Chhaya NEGRO PA-C, personally saw the patient today and spent greater than 30 minutes discharging this patient.    Discharge Disposition   Discharged to home  Condition at discharge: Stable    Consultations This Hospital Stay   None    Discharge Orders      Reason for your hospital stay    Partial SBO.     Adult [facility] Follow-up and recommended labs and tests    Follow up with Bariatric surgery for weight loss consult.     Follow up with transplant surgery following weight loss for ventral hernia repair consult.     Continue labs as previously scheduled.     Appointments on [place] and/or [facility] (with [facility] or [facility] provider or service). Call [phone] if you haven't heard regarding these appointments within 7 days of discharge.     Activity    Your activity upon discharge: activity as tolerated     Diet    Follow this diet upon discharge:       Regular Diet Adult     Discharge Medications   Current Discharge Medication List      START taking these medications    Details   magnesium oxide (MAG-OX) 400 MG tablet Take 1 tablet (400 mg) by mouth 2 times daily  Qty: 30 tablet, Refills: 1    Associated Diagnoses: SBO (small bowel obstruction) (H)         CONTINUE these medications which have NOT CHANGED    Details   albuterol (PROAIR HFA/PROVENTIL HFA/VENTOLIN HFA) 108 (90 Base) MCG/ACT inhaler Inhale 1-2 puffs into the lungs every 4 hours as needed for wheezing      chlorthalidone (HYGROTEN) 25 MG tablet Take 1 tablet by mouth daily.  Qty: 32 tablet, Refills: 0    Comments: Needs to see MD for further refills  Associated Diagnoses: Unspecified essential hypertension       mycophenolic acid (MYFORTIC - GENERIC EQUIVALENT) 360 MG EC tablet Take 1 tablet (360 mg) by mouth 2 times daily Annual appointment needed for medication refills  Qty: 60 tablet, Refills: 0    Comments: Appointment and labs needed  Associated Diagnoses: Kidney replaced by transplant; Pancreas replaced by transplant (H)      phentermine (ADIPEX-P) 37.5 MG capsule Take 37.5 mg by mouth daily      Prenatal Vit-Fe Fumarate-FA (PNV PRENATAL PLUS MULTIVITAMIN) 27-1 MG TABS Take 1 tablet by mouth daily  Qty: 30 tablet, Refills: 11    Associated Diagnoses: Pancreas replaced by transplant (H); Kidney replaced by transplant      PROGRAF 0.5 MG PO CAPSULE Take 1 capsule (0.5 mg) by mouth every morning Take with 1 mg capsule. Total dose is 1.5 mg every morning and 1 mg every evening.  Qty: 30 capsule, Refills: 3    Associated Diagnoses: Pancreas replaced by transplant (H); Kidney replaced by transplant      PROGRAF 1 MG PO CAPSULE Take 1 capsule (1 mg) by mouth 2 times daily Take with 0.5 mg capsules. Total dose is 1.5 mg every morning and 1 mg every evening.  Qty: 60 capsule, Refills: 3    Associated Diagnoses: Pancreas replaced by transplant (H); Kidney replaced by transplant      vitamin D3 (CHOLECALCIFEROL) 50 mcg (2000 units) tablet Take 2 tablets by mouth daily           Allergies   Allergies   Allergen Reactions     Contrast Dye Hives     Data   Most Recent 3 CBC's:Recent Labs   Lab Test 11/30/22  0626 11/29/22  0603 11/28/22  1821   WBC 9.6 7.9 13.2*   HGB 12.5 12.8 14.4   MCV 85 85 84    237 290      Most Recent 3 BMP's:  Recent Labs   Lab Test 11/30/22  0626 11/29/22  0603 11/28/22  1821    139 136   POTASSIUM 3.5 3.2* 4.7   CHLORIDE 103 104 99   CO2 22 21* 22   BUN 15.5 21.3* 23.2*   CR 0.88 1.02* 1.19*   ANIONGAP 12 14 15   NEELAM 8.8 8.8 9.7   GLC 94 91 116*     Most Recent 2 LFT's:  Recent Labs   Lab Test 11/28/22  1821 06/08/15  1035   AST  --  23   ALT 18 23   ALKPHOS 85 75   BILITOTAL 0.6 0.4      Most Recent INR's and Anticoagulation Dosing History:  Anticoagulation Dose History     Recent Dosing and Labs Latest Ref Rng & Units 11/20/2008 11/26/2008 11/29/2008 12/25/2008 3/19/2009 3/20/2009 6/19/2009    INR 0.86 - 1.14 1.19(H) 1.26(H) 1.17(H) 1.14 1.02 0.98 0.96        Most Recent 3 Troponin's:No lab results found.  Most Recent Cholesterol Panel:  Recent Labs   Lab Test 12/06/21  1621 10/29/15  1550 12/17/14  1120   CHOL  --   --  150   LDL  --   --  85   HDL  --   --  48*   TRIG 76   < > 87    < > = values in this interval not displayed.     Most Recent 6 Bacteria Isolates From Any Culture (See EPIC Reports for Culture Details):No lab results found.  Most Recent TSH, T4 and A1c Labs:  Recent Labs   Lab Test 12/17/14  1120   A1C 5.4

## 2022-11-30 NOTE — PROGRESS NOTES
Discharge instructions reviewed.  Patient verbalized understanding. PIV removed. Patient states that she was told by provider to eat before she leaves. Patient is going to order food and eat before leaving. Patient's meds will be picked from pharmacy by staff per patient request. Patient plans to call for Uber to bring her home after she is done eating.

## 2022-12-01 NOTE — TELEPHONE ENCOUNTER
Discharge  Received: Yesterday  Chhaya Ramirez PA-C [place], Nina Kenny, CHAGO Jamison,     Discharging today, see summary. Planning for follow up with bariatric surgery and then [place] back with us about ventral hernia repair. Let me know if you have any questions. Thanks.     Chart reviewed by RNCC for post-admission follow up: Per previous transplant surgery consult w/Dr. Goldman, BMI needs to be close to 30 or 170 lbs for referral to complex hernia group see note 12/22/21. Needs to work with weight loss management/bariatric surgery team first.    Significant Results and Procedures     CT ABD/PELVIS 11/28/22:   Large infraumbilical ventral hernia containing distended  small bowel. So this is fluid filled and could represent ileus. The  terminal ileum and distal ileum is collapsed. Recommend continued  clinical follow-up to exclude developing obstruction. Left lower  quadrant renal transplant.     Discharge Orders             Reason for your hospital stay     Partial SBO.          Adult [facility] Follow-up and recommended labs and tests     Follow up with Bariatric surgery for weight loss consult.      Follow up with transplant surgery following weight loss for ventral hernia repair consult.      Continue labs as previously scheduled.      Appointments on [place] and/or [facility] (with [facility] or [facility] provider or service). Call [phone] if you haven't heard regarding these appointments within 7 days of discharge.

## 2022-12-21 ENCOUNTER — PRE VISIT (OUTPATIENT)
Dept: SURGERY | Facility: CLINIC | Age: 52
End: 2022-12-21

## 2022-12-21 ENCOUNTER — VIRTUAL VISIT (OUTPATIENT)
Dept: SURGERY | Facility: CLINIC | Age: 52
End: 2022-12-21
Payer: MEDICARE

## 2022-12-21 DIAGNOSIS — K43.9 VENTRAL HERNIA WITHOUT OBSTRUCTION OR GANGRENE: Primary | ICD-10-CM

## 2022-12-21 PROCEDURE — 99441 PR PHYSICIAN TELEPHONE EVALUATION 5-10 MIN: CPT | Mod: 93 | Performed by: SURGERY

## 2022-12-21 NOTE — PROGRESS NOTES
Ronit Rodrigues is a 52 year old who is being evaluated via a billable video visit.      How would you like to obtain your AVS? MyChart  If the video visit is dropped, the invitation should be resent by: Text to cell phone: [phone]  Will anyone else be joining your video visit? No  If patient encounters technical issues they should call [phone]    During this virtual visit the patient is located in [place], patient verifies this as the location during the entirety of this visit.     This was a telephone visit that began at approximately 8:06 AM and ended at 8:18 AM this is a 52-year-old female with a history of type 1 diabetes.  History of kidney pancreas transplant in 2008 and a ventral hernia repair in 2012 done laparoscopically at [facility].  She did develop a recurrence.  She has a body mass index of 36.61 and she is seen this in the past for repair of this.  She is very eager to have this repaired and understands that she should lose some weight in anticipation of this.  She was recently admitted to the hospital in late November.  With abdominal pain nausea and vomiting.  Patient was diagnosed with acute pancreatitis ultimately discharged home and is following up now for next steps.  Plan at this point will be to have the patient actively target 30 pound weight loss.  I do not think that given the strength of some of the newer medications for weight loss that we would necessarily need to do a bariatric procedure but rather she might do well with initial attempts at medication management.  We will need to carefully consider the fact that she has pancreatitis in the past.

## 2022-12-21 NOTE — Clinical Note
Please have the patient see our weight loss group (history of kidney pancreas transplant and pancreatitis) has a very large ventral hernia and this will need repair after minimum 30 pound weight loss.

## 2022-12-21 NOTE — LETTER
12/21/2022       RE: Ronit Rodrigues  [address]  [place] [address]     Dear Colleague,    Thank you for referring your patient, Ronit Rodrigues, to the [facility] [place] [facility]. Please see a copy of my visit note below.    Ronit Rodrigues is a 52 year old who is being evaluated via a billable video visit.      How would you like to obtain your AVS? MyChart  If the video visit is dropped, the invitation should be resent by: Text to cell phone: [phone]  Will anyone else be joining your video visit? No  If patient encounters technical issues they should call [phone]    During this virtual visit the patient is located in [place], patient verifies this as the location during the entirety of this visit.     This was a telephone visit that began at approximately 8:06 AM and ended at 8:18 AM this is a 52-year-old female with a history of type 1 diabetes.  History of kidney pancreas transplant in 2008 and a ventral hernia repair in 2012 done laparoscopically at [facility].  She did develop a recurrence.  She has a body mass index of 36.61 and she is seen this in the past for repair of this.  She is very eager to have this repaired and understands that she should lose some weight in anticipation of this.  She was recently admitted to the hospital in late November.  With abdominal pain nausea and vomiting.  Patient was diagnosed with acute pancreatitis ultimately discharged home and is following up now for next steps.  Plan at this point will be to have the patient actively target 30 pound weight loss.  I do not think that given the strength of some of the newer medications for weight loss that we would necessarily need to do a bariatric procedure but rather she might do well with initial attempts at medication management.  We will need to carefully consider the fact that she has pancreatitis in the  past.              Again, thank you for allowing me to participate in the care of your patient.      Sincerely,    Steven Tarango MD

## 2022-12-26 ENCOUNTER — HEALTH MAINTENANCE LETTER (OUTPATIENT)
Age: 52
End: 2022-12-26

## 2022-12-28 PROBLEM — U07.1 CLINICAL DIAGNOSIS OF COVID-19: Status: ACTIVE | Noted: 2022-11-28

## 2023-02-03 ENCOUNTER — TELEPHONE (OUTPATIENT)
Dept: ENDOCRINOLOGY | Facility: CLINIC | Age: 53
End: 2023-02-03
Payer: MEDICARE

## 2023-02-03 NOTE — TELEPHONE ENCOUNTER
[place] and sent [employer] for scheduling new visit in weight mgmt.  Reason:  Patient needs to lose weight for hernia surgery, per Dr. Tarango.

## 2023-04-16 ENCOUNTER — HEALTH MAINTENANCE LETTER (OUTPATIENT)
Age: 53
End: 2023-04-16

## 2023-04-19 NOTE — PROGRESS NOTES
"    New Medical Weight Management Consult    PATIENT:  Ronit Rodrigues  MRN:         [id number]  :         1970  JANY:         2023      I had the pleasure of seeing  Ronit Rodrigues. Full intake/assessment was done to determine barriers to weight loss success and develop a treatment plan. Ronit Rodrigues is a 53 year old female interested in treatment of medical problems associated with excess weight. She has a height of 5' 4.5\"[Pt reported[, a weight of 218 lbs 0 oz, and the calculated Body mass index is 36.84 kg/m .    ASSESSMENT/PLAN:  1. Morbid obesity (H)  We discussed healthy habits to assist with weight loss. She will work on planning meals ahead of time using the plate method for portion control and macronutrient proportions. She doesn't think she can make the changes necessary to lose weight. She is not absolutely sure the phentermine works unless she takes it consistently  She is not a candidate for GLP1 agonists because of her recent history of pancreatitis. She is not interested in trying topamax or contrave. After much discussion she decided to go back on the phentermine and try to take it consistently. She will work with our dietician on cleaning up her eating habits. She will check out the you tube exercise sites for videos to help her know what to do for exercise. She declined PT.      2. Dyslipidemia  This may improve with healthy habits and weight loss.    3. Hypertension, unspecified type  This may improve with healthy habits and weight loss.    4. Ventral hernia  She will need to lose weight to have this repaired.    She has the following co-morbidities:        2023     4:24 PM   --   I have the following health issues associated with obesity Sleep Apnea    Osteoarthritis (joint disease)   I have the following symptoms associated with obesity Back Pain    Fatigue    Hip Pain           2023     4:24 PM   Patient Goals   If yes, please indicate which surgery? Hernia repair " "maybe a sleeve      Patient has a ventral hernia that needs repair. She saw Dr. Rodriguez in December who wanted her to lose 30# prior to having the surgery. She does not know what her weight was then and also does not know what her weight is now.         4/19/2023     4:24 PM   Referring Provider   Please name the provider who referred you to Medical Weight Management  If you do not know, please answer \"I Don't Know\" Dr. Cox (not sure the spelling off top))           4/19/2023     4:24 PM   Weight History   How concerned are you about your weight? Very Concerned   I became overweight After Pregnancy   The following factors have contributed to my weight gain Change in Schedule    Eating Wrong Types of Food    Eating Too Much    Lack of Exercise   I have tried the following methods to lose weight Medications   My lowest weight since age 20 was 127   My highest weight since age 20 was 225   The most weight I have ever lost was (lbs) 35   I have the following family history of obesity/being overweight I am the only one in my immediate family who is overweight   How has your weight changed over the last year? Gained   How many pounds? Not sure           4/19/2023     4:24 PM   Diet Recall Review with Patient   If you do eat supper, what types of food do you typically eat?  Patient does not have a set schedule. She tends to eat very little during the day and then overeats at night Italian soups meals I make meat vegetables starches   If you do snack, what types of food do you typically eat? Asian mix, crackers cheese, fruit,cereal  chips   How many glasses of juice do you drink in a typical day? 1   How many of glasses of milk do you drink in a typical day? 3   If you do drink milk, what type? 2%   How many 8oz glasses of sugar containing drinks such as Efrain-Aid/sweet tea do you drink in a day? 1- orange juice or strawberry lemonaide   How many cans/bottles of sugar pop/soda/tea/sports drinks do you drink in a day? " 0   How many cans/bottles of diet pop/soda/tea or sports drink do you drink in a day? 0   How often do you have a drink of alcohol? Never           4/19/2023     4:24 PM   Eating Habits   Generally, my meals include foods like these bread, pasta, rice, potatoes, corn, crackers, sweet dessert, pop, or juice Almost Everyday   Generally, my meals include foods like these fried meats, brats, burgers, french fries, pizza, cheese, chips, or ice cream Less Than Weekly   Eat fast food (like McDonalds, Burger Kristopher, Taco Bell) Less Than Weekly   Eat at a buffet or sit-down restaurant Less Than Weekly   Eat most of my meals in front of the TV or computer A Few Times a Week   Often skip meals, eat at random times, have no regular eating times Everyday   Rarely sit down for a meal but snack or graze throughout A Few Times a Week   Eat extra snacks between meals A Few Times a Week   Eat most of my food at the end of the day Everyday   Eat in the middle of the night or wake up at night to eat Almost Everyday   Eat extra snacks to prevent or correct low blood sugar Never   Eat to prevent acid reflux or stomach pain Never   Worry about not having enough food to eat Never   I eat when I am depressed Less Than Weekly   I eat when I am stressed Less Than Weekly   I eat when I am bored A Few Times a Week   I eat when I am anxious A Few Times a Week   I eat when I am happy or as a reward Never   I feel hungry all the time even if I just have eaten Never   Feeling full is important to me A Few Times a Week   I finish all the food on my plate even if I am already full Almost Everyday   I can't resist eating delicious food or walk past the good food/smell Almost Everyday   I eat/snack without noticing that I am eating Never   I eat when I am preparing the meal Less Than Weekly   I eat more than usual when I see others eating Less Than Weekly   I have trouble not eating sweets, ice cream, cookies, or chips if they are around the house A Few  Times a Week   I think about food all day Less Than Weekly   What foods, if any, do you crave? Cheese   Please list any other foods you crave? Sweets      Meals not prepared at home per week: cut back recently to 0-1, fast food        4/19/2023     4:24 PM   Amount of Food   I make myself vomit what I have eaten or use laxatives to get rid of food Never   I eat a large amount of food, like a loaf of bread, a box of cookies, a pint/quart of ice cream, all at once Never   I eat a large amount of food even when I am not hungry Never   I eat rapidly Weekly   I eat alone because I feel embarrassed and do not want others to see how much I have eaten Never   I eat until I am uncomfortably full Almost Everyday- she feels she gets over full with very little food   I feel bad, disgusted, or guilty after I overeat Weekly           4/19/2023     4:24 PM   Activity/Exercise History   How much of a typical 12 hour day do you spend sitting? Half the Day   How much of a typical 12 hour day do you spend lying down? Less Than Half the Day   How much of a typical day do you spend walking/standing? Half the Day   How many hours (not including work) do you spend on the TV/Video Games/Computer/Tablet/Phone? 6 Hours or More   How many times a week are you active for the purpose of exercise? Once a Week   What keeps you from being more active? Pain- her large belly causes back pain   How many total minutes do you spend doing some activity for the purpose of exercising when you exercise? 15-30 Minutes- ADLs       PAST MEDICAL HISTORY:  Past Medical History:   Diagnosis Date     Acute pancreatitis 6/8/2015     Asthma 11/8/2013     Back pain 7/22/2014     Clinical diagnosis of COVID-19 11/28/2022     Diabetes (H)     history of diabetes     Dyslipidemia     Resolved after transplant     Encounter for long-term (current) use of medications      History of blood transfusion      Hypertension     Resolved after kidney transplant     Hypokalemia  6/10/2015     Hypomagnesemia 6/10/2015     Kidney replaced by transplant      Pancreas replaced by transplant (H)      Rash     on legs; boils     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 75%   What is your marital status? Single   If you have children, are they overweight? No   Who do you live with? Self   Who does the food shopping? Me           4/19/2023     4:24 PM   Mental Health History Reviewed With Patient   How often in the past 2 weeks have you felt little interest or pleasure in doing things? More Than Half the Days           4/19/2023     4:24 PM   Sleep History Reviewed With Patient   How many hours do you sleep at night? 6       MEDICATIONS:   Current Outpatient Medications   Medication Sig Dispense Refill     albuterol (PROAIR HFA/PROVENTIL HFA/VENTOLIN HFA) 108 (90 Base) MCG/ACT inhaler Inhale 1-2 puffs into the lungs every 4 hours as needed for wheezing       chlorthalidone (HYGROTEN) 25 MG tablet Take 1 tablet by mouth daily. 32 tablet 0     mycophenolic acid (MYFORTIC - GENERIC EQUIVALENT) 360 MG EC tablet Take 1 tablet (360 mg) by mouth 2 times daily Annual appointment needed for medication refills 60 tablet 0     Prenatal Vit-Fe Fumarate-FA (PNV PRENATAL PLUS MULTIVITAMIN) 27-1 MG TABS Take 1 tablet by mouth daily 30 tablet 11     PROGRAF 0.5 MG PO CAPSULE Take 1 capsule (0.5 mg) by mouth every morning Take with 1 mg capsule. Total dose is 1.5 mg every morning and 1 mg every evening. 30 capsule 3     PROGRAF 1 MG PO CAPSULE Take 1 capsule (1 mg) by mouth 2 times daily Take with 0.5 mg capsules. Total dose is 1.5 mg every morning and 1 mg every evening. 60 capsule 3     vitamin D3 (CHOLECALCIFEROL) 50 mcg (2000 units) tablet Take 2 tablets by mouth  daily         ALLERGIES:   Allergies   Allergen Reactions     Diagnostic X-Ray Materials Hives     Apple      Contrast Dye Hives     ROS  General  Fatigue: yes  HEENT  Hx of glaucoma: not sure  Vision changes: no  Cardiovascular  Hx of heart disease: no  Chest Pain with Exertion: no  Palpitations: no  Pulmonary  Shortness of breath at rest: no  Shortness of breath with exertion: no  Stop-bang score: not done  Richland Score: not done  Gastrointestinal  Heartburn: no  Pancreatitis: yes  Psychiatric  Moods Stable: yes  Endocrine  Polydipsia: no  No personal or family history of medullary thyroid cancer: no  Neurologic  Hx of seizures: no  Migraine headaches: not discussed    Birth control: menopause  Kidney stones: no    PHYSICAL EXAM:  GENERAL: Healthy, alert and no distress  EYES: Eyes grossly normal to inspection.  No discharge or erythema, or obvious scleral/conjunctival abnormalities.  RESP: No audible wheeze, cough, or visible cyanosis.  No visible retractions or increased work of breathing.    SKIN: Visible skin clear. No significant rash, abnormal pigmentation or lesions.  NEURO: Cranial nerves grossly intact.  Mentation and speech appropriate for age.  PSYCH: Mentation appears normal, affect normal/bright, judgement and insight intact, normal speech and appearance well-groomed.    FOLLOW-UP:   12 weeks with a [facility] provider    Total time spent on the date of this encounter doing: chart review, review of test results, patient visit, physical exam, education, counseling, developing plan of care and documenting = 64 minutes.    Sincerely,    CHETNA Edwards MD

## 2023-04-20 ENCOUNTER — VIRTUAL VISIT (OUTPATIENT)
Dept: SURGERY | Facility: CLINIC | Age: 53
End: 2023-04-20
Payer: MEDICARE

## 2023-04-20 VITALS — WEIGHT: 218 LBS | HEIGHT: 65 IN | BODY MASS INDEX: 36.32 KG/M2

## 2023-04-20 DIAGNOSIS — I10 HYPERTENSION, UNSPECIFIED TYPE: ICD-10-CM

## 2023-04-20 DIAGNOSIS — E66.01 MORBID OBESITY (H): Primary | ICD-10-CM

## 2023-04-20 DIAGNOSIS — E78.5 DYSLIPIDEMIA: ICD-10-CM

## 2023-04-20 PROCEDURE — 99205 OFFICE O/P NEW HI 60 MIN: CPT | Mod: VID | Performed by: FAMILY MEDICINE

## 2023-04-20 PROCEDURE — 97803 MED NUTRITION INDIV SUBSEQ: CPT | Mod: VID

## 2023-04-20 PROCEDURE — 97802 MEDICAL NUTRITION INDIV IN: CPT | Mod: VID | Performed by: DIETITIAN, REGISTERED

## 2023-04-20 RX ORDER — PHENTERMINE HYDROCHLORIDE 37.5 MG/1
TABLET ORAL
Qty: 90 TABLET | Refills: 0 | Status: SHIPPED | OUTPATIENT
Start: 2023-04-20

## 2023-04-20 NOTE — PROGRESS NOTES
"Ronit is a 53 year old who is being evaluated via a billable video visit.      The patient has been notified of following:     \"This video visit will be conducted via a call between you and your physician/provider. We have found that certain health care needs can be provided without the need for an in-person physical exam.  This service lets us provide the care you need with a video conversation.  If a prescription is necessary we can send it directly to your pharmacy.  If lab work is needed we can place an order for that and you can then stop by our lab to have the test done at a later time.    Video visits are billed at different rates depending on your insurance coverage.  Please reach out to your insurance provider with any questions.    If during the course of the call the physician/provider feels a video visit is not appropriate, you will not be charged for this service.\"    Patient has given verbal consent for Video visit? Yes    How would you like to obtain your AVS? MyChart    If the video visit is dropped, the invitation should be resent by: Text to cell phone: [phone]    Will anyone else be joining your video visit? No    I  Video-Visit Details    Type of service:  Video Visit    Video Start Time: 1:00 pm    Video End Time:1:40 PM    Originating Location (pt. Location): Home    Distant Location (provider location):  [facility]     Platform used for Video Visit: Sam  "

## 2023-04-20 NOTE — LETTER
Franko Cottrell,  It was nice meeting with you today. Please call [phone] to set up a follow up appointment in 3   months.  Leona Edwards     Eat Better ? Move More ? Live Well    Eat 3 nutrient-rich meals each day     Don t skip meals--it will cause you to overeat later in the day!     Eating fiber (vegetables/fruits/whole grains) and protein with meals helps you stay full longer     Choose foods with less than 10 grams of sugar and 5 grams of fat per serving to prevent excess calories and weight re-gain   Eat around the same times each day to develop a routine eating schedule    Avoid snacking unless physically hungry.   Planned snacks: 1-2 times per day and no more than 150 calories    Eat protein first    Protein helps with healing, maintaining adequate muscle mass, reducing hunger and optimizing nutritional status    Aim for 60-80 grams of protein per day   Fill up on Fiber    Fiber comes from plants--fruits, veggies, whole grains, nuts/seeds and beans    Fiber is low in calories, high in phytonutrients and helps you stay full longer    Aim for 25-35 grams per day by eating fiber with meals and snacks  Eat [id number]    Take 20-30 minutes to eat each meal by taking small bites, chewing foods to applesauce consistency or 20-30 times before you swallow    Eating foods too fast can delay satiety/fullness signals and increase overeating   ? Slow down your eating by using toddler utensils, putting your fork/spoon down between bites and not watching TV or emailing during meals!   Keep a Journal          Writing down what you eat, how you feel and when you are active helps you identify new changes to work on from week to week          Look for ways to cut 100 calories from your current diet 2-3 times per day  Drink 64 ounces of 0-Calorie drinks between meals    Water    Zero calorie Propel  or Vitamin Water      SoBe Lifewater  Zero Calories    Crystal Light , Sugar-Free Efrain-Aid , and other sugar-free lemonade or  flavored arevalo    Keep Caffeine to less than 300mg per day ie: 3-6oz cups coffee     Work up to 45-60 minutes of physical activity most days of the week    Helps with losing weight and prevent regaining those extra pounds!     Do a combo of cardio (walking/water exercises) and strength training (lifting weights/Vinyasa yoga)    Avoid Mindless Eating    Be present when you eat--take note of the smell, taste and quality of your food    Make a list of alternative activities you could do to prevent eating out of boredom/stress  ? Go for a walk, call a friend, chew gum, paint your nails, re-organize the garage, etc

## 2023-04-20 NOTE — PROGRESS NOTES
"Telemedicine Visit: The patient's condition can be safely assessed and treated via synchronous audio and visual telemedicine encounter.      Reason for Telemedicine Visit: Patient has requested telehealth visit    Originating Site (Patient Location): Patient's home        Distant Location (provider location):  On-site    Consent:  The patient/guardian has verbally consented to: the potential risks and benefits of telemedicine (video visit) versus in person care; bill my insurance or make self-payment for services provided; and responsibility for payment of non-covered services.     Mode of Communication:  Video Conference via [employer]    As the provider I attest to compliance with applicable laws and regulations related to telemedicine.    MEDICAL WEIGHT LOSS INITIAL EVALUATION  DIAGNOSIS:  Obese, class II    NUTRITION HISTORY:  Breakfast: skips   Lunch: banana and tunafish sandwich + chips   Dinner: [employer] mac and cheese   Snacks: cup of noodles with sriracha  Beverage choices: Fairlife milk -2%; strawberry lemonade     Exercise: Patient does not have an exercise regimen.     Additional Information: Patient eats 1 meal per day + snacks in the evening.  Patient  does not feel hungry until 2 PM.  Patient  has met with with many providers in the past and needs to know what to eat.  Patient liked savory foods (Doritos, pretzels, chex-like mix)    Patient  needs to lose weight for hernia surgery.  Patient  has 3 hernias that need to be repaired.  Patient states she does not like to go outside due to her hernias.     Bed by 12 AM- up at 7:30-8:30    Patient has tried phentermine in the past but did not take it consistently.     MEDICATIONS:  Phentermine    ANTHROPOMETRICS:  Height: 5'4.5\"  Weight: 218 lbs patient reported   BMI:  36.8 kg/m2  NUTRITION DIAGNOSIS:   Obese class II related to overeating and poor lifestyle habits as evidence by patient's subjective statements and BMI of 36.8 " kg/m2   NUTRITION INTERVENTIONS  Nutrition Prescription:  Recommend modified energy- nutrient intake  Implementation:  Nutrition Education (Content):    Discussed portion sizes, label reading, carbohydrate counting.  Discussed consistent eating pattern     Reviewed healthy snacking     Provided: Tips for Weight Loss and Weight Management; meal plan       Nutrition Education (Application):     Patient to practice goals as stated below    Patient verbalizes understanding of diet by stating will follow meal plan     Expected patient engagement: good     Goals:  Eat breakfast within in 1 hour of waking  Eat 3 meals per day 4-5 hours apart  Read labels for portion sizes of snacks -limit to 15 grams     FOLLOW UP AND MONITORING:   Other  - follow up in 4 weeks.     TIME SPENT WITH PATIENT:  35 minutes   Bi Arcos, RD, LD  [facility] Outpatient Dietitian/[facility] Loss [facility]   [phone] (office phone)

## 2023-04-20 NOTE — PATIENT INSTRUCTIONS
Here are the [employer] exercise sites:    Yoga-https://www.Porous Power.com/user/yogawithadriene    Body strength activities, dance, high intensity interval training- https://www.P[employer] Power.com/user/popsugartvfit    Strength training- https://www.P[employer]/user/KozakSportsPerform    Walking- https://www.P[employer] Power.com/user/walkathomemedia    Sit and Be Fit- https://www.Porous Power.[employer]/channel/UCLgvL3aGzMByecNYtMcyK_g    Low impact cardio- Jeannie Rondon- https://www.Porous Power.com/channel/KLvnIEaDbkxRcojR4inhobeJ    Cardio Holly Morgan- https://www.Porous Power.com/channel/OLYdMvvs6VXvYFAXS6aQsBYg    30 Min low impact cardio- https://www.AllBusiness.comube.com/watch?v=gC_L9qAHVJ8    Body Project- https://www.P[employer]/channel/HCVkv6B67-NvCbOIrIpqY4PA

## 2023-04-21 NOTE — PATIENT INSTRUCTIONS
Franko Cottrell,    It was nice meeting you.  Here are some of the talking points:    Eat breakfast within in 1 hour of waking  Eat 3 meals per day 4-5 hours apart  Read labels for portion sizes of snacks -limit to 15 grams     Meal planning options:  Infogami (jules)  Eat This Much (website)  Diabetes Food Hub (website)    Please call [phone] to schedule your next RD appointment in 4 weeks.    Have a great month!    Thanks,    Bi Gold, RD, LD  [facility] Outpatient Dietitian/Weight Loss Clinic   [phone] (office phone)

## 2023-09-17 ENCOUNTER — HEALTH MAINTENANCE LETTER (OUTPATIENT)
Age: 53
End: 2023-09-17

## 2023-10-12 ENCOUNTER — TELEPHONE (OUTPATIENT)
Dept: TRANSPLANT | Facility: CLINIC | Age: 53
End: 2023-10-12
Payer: MEDICARE

## 2023-10-12 NOTE — LETTER
PHYSICIAN ORDERS      DATE & TIME ISSUED: 2023 7:20 AM  PATIENT NAME: Ronit Rodrigues   : 1970     [facility] MR# [if applicable]: [id number]     DIAGNOSIS:  Kidney/Pancreas Transplant  ICD-10 CODE: Z94.0/Z94.83     Please repeat the following labs in 1-2 weeks:  Tacrolimus drug level  CBC  BMP  Amylase  Lipase    Any questions please call: [phone]  Please fax lab results to [phone].    .

## 2023-10-12 NOTE — TELEPHONE ENCOUNTER
ISSUE:  K+ 3.3  No lipase checked  Tacrolimus 15.7  Creatinine slightly elevated at 1.1     PLAN:  Call and confirm a 12 hour tacro trough   Tacro prescribed by outside physician, advise she follow up with prescribing doctor to see if a dose adjustment is needed.   Increase intake of K+ rich foods   Increase hydration   Recommend repeating labs     LPN TASK:  Call with above   Update lab order if needed

## 2023-10-13 NOTE — TELEPHONE ENCOUNTER
[employer] message sent to patient regarding:  ISSUE:  K+ 3.3  No lipase checked  Tacrolimus 15.7  Creatinine slightly elevated at 1.1      PLAN:  Call and confirm a 12 hour tacro trough   Tacro prescribed by outside physician, advise she follow up with prescribing doctor to see if a dose adjustment is needed.   Increase intake of K+ rich foods   Increase hydration   Recommend repeating labs

## 2023-11-13 ENCOUNTER — TELEPHONE (OUTPATIENT)
Dept: TRANSPLANT | Facility: CLINIC | Age: 53
End: 2023-11-13
Payer: MEDICARE

## 2023-11-13 NOTE — TELEPHONE ENCOUNTER
Ronit calling to speak with Coordinator, needing to schedule a Hernia repair with Dr. Moncada and is needing order to do so

## 2023-11-13 NOTE — TELEPHONE ENCOUNTER
Ronit called to schedule an appt with Dr. Durant.   Per schedulers; she needs an order in her chart.

## 2023-11-14 ENCOUNTER — TELEPHONE (OUTPATIENT)
Dept: TRANSPLANT | Facility: CLINIC | Age: 53
End: 2023-11-14
Payer: MEDICARE

## 2023-11-14 DIAGNOSIS — Z94.83 PANCREAS REPLACED BY TRANSPLANT (H): ICD-10-CM

## 2023-11-14 DIAGNOSIS — Z98.890 OTHER SPECIFIED POSTPROCEDURAL STATES: Primary | ICD-10-CM

## 2023-11-14 DIAGNOSIS — Z94.0 KIDNEY REPLACED BY TRANSPLANT: ICD-10-CM

## 2023-11-14 NOTE — TELEPHONE ENCOUNTER
Ronit called with concern of acute change in pain in setting of known hernia.  Per Ronit - grant lower abdomen bilateral hernia s/p repair and then rupture.    Has pulsating pain on left lower side (kidney transplant).   Standing makes the pain worse. Rolling movement from side to side aggravates pain.    She feels weak in her abdomen.    Prior to acute change in pain she was active in lifting medium sized dogs (about 20lbs each) and shampooing her carpet.    Her most recent imaging was completed 1 year ago while she was being seen inpatient at [facility].      Of note:  No accurate tacrolimus level in the last two monthly draws. Due for tacrolimus level now (previously asked to recheck 2 weeks after recent low level).    RNCC advised that while this acute pain may be due to aggravation of the hernia, we need to obtain labs to ensure we rule out other potential factors, such as transplant rejection.    RNCC discussed options to have imaging / labs done within the [facility] system (yet outside of [place]), as this may be most efficient.    Will review with MD need for return to clinic.     RNCC advised Ronit that she may not hear back re: clinic visit until Thursday, but please continue with labs this week.

## 2023-11-14 NOTE — TELEPHONE ENCOUNTER
Patient Call: General  Route to LPN    Reason for call: called in regards of patient having pain on left side below transplant site. Pain has been going on since last Friday. Patient looking to setup appointment with Dr. Durant. More details with call back.     Call back needed? Yes    Return Call Needed  Same as documented in contacts section  When to return call?: Same day: Route High Priority

## 2023-11-15 ENCOUNTER — TELEPHONE (OUTPATIENT)
Dept: TRANSPLANT | Facility: CLINIC | Age: 53
End: 2023-11-15

## 2023-11-15 ENCOUNTER — LAB (OUTPATIENT)
Dept: LAB | Facility: CLINIC | Age: 53
End: 2023-11-15
Payer: MEDICARE

## 2023-11-15 DIAGNOSIS — Z94.0 KIDNEY REPLACED BY TRANSPLANT: ICD-10-CM

## 2023-11-15 DIAGNOSIS — Z94.83 PANCREAS REPLACED BY TRANSPLANT (H): ICD-10-CM

## 2023-11-15 DIAGNOSIS — R10.9 ABDOMINAL PAIN: ICD-10-CM

## 2023-11-15 DIAGNOSIS — K43.9 HERNIA, VENTRAL: ICD-10-CM

## 2023-11-15 DIAGNOSIS — R79.89 ABNORMAL CBC: ICD-10-CM

## 2023-11-15 DIAGNOSIS — Z98.890 OTHER SPECIFIED POSTPROCEDURAL STATES: ICD-10-CM

## 2023-11-15 DIAGNOSIS — R79.89 ELEVATED SERUM CREATININE: Primary | ICD-10-CM

## 2023-11-15 LAB
AMYLASE SERPL-CCNC: 30 U/L (ref 28–100)
ANION GAP SERPL CALCULATED.3IONS-SCNC: 13 MMOL/L (ref 7–15)
BUN SERPL-MCNC: 20.1 MG/DL (ref 6–20)
CALCIUM SERPL-MCNC: 9.7 MG/DL (ref 8.6–10)
CHLORIDE SERPL-SCNC: 98 MMOL/L (ref 98–107)
CREAT SERPL-MCNC: 1.33 MG/DL (ref 0.51–0.95)
DEPRECATED HCO3 PLAS-SCNC: 26 MMOL/L (ref 22–29)
EGFRCR SERPLBLD CKD-EPI 2021: 48 ML/MIN/1.73M2
ERYTHROCYTE [DISTWIDTH] IN BLOOD BY AUTOMATED COUNT: 13 % (ref 10–15)
GLUCOSE SERPL-MCNC: 96 MG/DL (ref 70–99)
HCT VFR BLD AUTO: 45.8 % (ref 35–47)
HGB BLD-MCNC: 14.7 G/DL (ref 11.7–15.7)
LIPASE SERPL-CCNC: 16 U/L (ref 13–60)
MCH RBC QN AUTO: 27.3 PG (ref 26.5–33)
MCHC RBC AUTO-ENTMCNC: 32.1 G/DL (ref 31.5–36.5)
MCV RBC AUTO: 85 FL (ref 78–100)
PLATELET # BLD AUTO: 310 10E3/UL (ref 150–450)
POTASSIUM SERPL-SCNC: 3.4 MMOL/L (ref 3.4–5.3)
RBC # BLD AUTO: 5.39 10E6/UL (ref 3.8–5.2)
SODIUM SERPL-SCNC: 137 MMOL/L (ref 135–145)
TACROLIMUS BLD-MCNC: 5.1 UG/L (ref 5–15)
TME LAST DOSE: NORMAL H
TME LAST DOSE: NORMAL H
WBC # BLD AUTO: 7.1 10E3/UL (ref 4–11)

## 2023-11-15 PROCEDURE — 82150 ASSAY OF AMYLASE: CPT

## 2023-11-15 PROCEDURE — 36415 COLL VENOUS BLD VENIPUNCTURE: CPT

## 2023-11-15 PROCEDURE — 83690 ASSAY OF LIPASE: CPT

## 2023-11-15 PROCEDURE — 80048 BASIC METABOLIC PNL TOTAL CA: CPT

## 2023-11-15 PROCEDURE — 87799 DETECT AGENT NOS DNA QUANT: CPT

## 2023-11-15 PROCEDURE — 80197 ASSAY OF TACROLIMUS: CPT

## 2023-11-15 PROCEDURE — 85027 COMPLETE CBC AUTOMATED: CPT

## 2023-11-15 NOTE — TELEPHONE ENCOUNTER
Ronit called in, as she expected to complete a CT scan 11/15/2023 , given conversation on 11/14/2023.    PCS discussed why CT was not ordered and thoroughly reviewed resulted labs.   Elevated serum creatinine, all else at baseline, in setting of unknown tacrolimus level (last level low).   Educated regarding potential reasons for elevated serum creatinine:  Dehydration, rejection, VERNA d/t tacrolimus toxicity.    Ronit shared that abdominal pain is improving.  She feels that she is lightheaded upon standing, has not taken her BP.  Denies any N/V/D / excessive fluid loss.  Endorses drinking 40-50oz hydrating fluid daily, no caffeine intake.    IS currently prescribed by Dr. Puga. Ronit reports taking Prograf 1.5mg AM, 1mg PM (not 1.5mg BID, as on file from 2017 as prescribed by [facility] transplant nephrology).    Given clinical information, with important piece not yet available for evaluation (current tacrolimus level), PCS gave Ronit two options:  Proceed home, improved oral hydration, repeat labs in 1 week.  Continue to wait at [facility] and PCS will discuss potential need for CT scan with surgical CA (give that surgeons are currently in regulatory meetings).     Ronit decided upon option 1 - she will await communication from primary RNCC after discussion with transplant team and resulting of tacrolimus level.

## 2023-11-15 NOTE — TELEPHONE ENCOUNTER
Post Kidney and Pancreas Transplant Team Conference  Date: 11/15/2023  Transplant Coordinator: Nina Hewitt     Attendees:  [x]  Dr. Dutta [] Nelia Ng, RN [x] Mary Kay Spring LPN     [x]  Dr. Ovalles [x] Roberta Cabrales, RN [] Elise Thompson LPN    [x] Dr. Cabrera [x] Beth Leal, CHAGO    [x] Dr. Ferrari [] Rosie Saez, CHAGO [x] Oralia Colorado RN   [] Dr. Singer [x] Nina Hewitt, CHAGO    [x] Dr. Ghosh [x] Jeannie Pearson, CHAGO    []  Dr. Goldman [x] Yudy Oseguera, CHAGO    [x] Dr. Durant [] Gael Schofield RN    [x] Sol Shaver, KATERINA [x] Lubna Perez RN    [x] Toya Barahona NP [x] Mamie Mike RN        Verbal Plan Read Back:   OK to schedule with Dr. Goldman on Monday    Routed to RN Coordinator   Mary Kay Spring LPN

## 2023-11-15 NOTE — TELEPHONE ENCOUNTER
Spoke to patient and let her know that labs only was recommended this AM and that her case would be reviewed with the txp team this afternoon.  Patient states that she thought that CT was ordered and needed prior to this meeting.  Patient is waiting in clinic @ [facility] location and would like a call back from Oralia.

## 2023-11-16 LAB — BKV DNA # SPEC NAA+PROBE: NOT DETECTED COPIES/ML

## 2023-11-16 NOTE — TELEPHONE ENCOUNTER
See call encounter note. Upon speaking with patient she refuses to see any other transplant surgeon than Dr. Durant. Will complete CT prior to visit w/him.     Nina Hewitt, RN, BSN  Solid Organ Transplant, Post Kidney and Pancreas  Transplant Care Coordinator  [phone]

## 2023-11-16 NOTE — TELEPHONE ENCOUNTER
"RNCC returned call to Ronit to discuss follow up recommendations given by the transplant nephrologist/transplant surgery team.    - Obtain CT scan. Provided scheduling phone number. Instructed to complete prior to visit with surgeon. She expresses wished to have CT done at [facility].     - Order placed for transplant surgeon visit; call schedulers at [phone] option 4. Ronit states that although Dr. Boles from complex hernia surgery team agreed to operate that Dr. Durant told her that he needs to be involved. She does not want to see any other transplant surgeon but him.     We discussed previous recommendation of 30 lb weight loss and this writer asked if she was in process of following up with weight management team. She states she isn't interested in bariatric surgery and didn't find last visit helpful, \"was handed a pamphlet and told what to eat.\" Explained that operation when obese carries a certain risk up to frankly possible death and that surgeon weighs the benefit versus risk in making any decision. This can be further discussed with surgeon but Ronit states, \"they just won't operate because it is more work for them to move the fat around.\" RNCC states can not speak to that statement. She voiced that she can not take weight loss medications due to risk of pancreatitis and frustrated that this hernia has been left \"untreated since ~2013.\"     - Tacrolimus 12 hr trough level 5.1 on 11/16/23 11:47 AM at goal 5-8 based on last dose 11:30 PM. Continue to follow with prescribing/managing provider for maintenance at this time. Patient admits she doesn't take meds every 12 hours, \"timing needs to be more consistent.\" In fact she states that because she was focused on obtaining 12 hour trough timing for the level yesterday she forgot to take the meds she held for blood draw until 3 pm. Then took evening dose at 10:30 PM. We discussed importance of consistent levels in the bloodstream with every 12 hour " "dose; the variable tac levels in the past; the fact that yes she was okay to take meds as soon as she remember then 6 hrs later get back on track with evening dose. We discussed monitoring labs monthly for pancreas transplant patients; she states it used to be 3 months. We clarified that yes kidney transplant patients alone go for standing quarterly labs but pancreas transplant patients should have lab monitoring monthly. States she has only been going for labs once per year. We discussed follow up with her general nephrologist and she notes that general nephrologist is \"typically just see for little things but she comes to transplant for bigger concerns.\" that she is going to visit Dr. Dutta whom she states she \"hasn't seen since 2009,\" in April 2024. Advised that with recent creatinine increase to 1.3  (baseline 1.1) that she repeat labs in 1-2 weeks prior to visit w/Dr. Durant. Ronit says she is dehydrated. We discussed that because she is 15 years out from transplant chronic changes can happen and certainly dehydration can cause 0.2 increase in creatinine but doesn't automatically indicate rejection. We first look for cause of why increase may have happened and now that we have a good 12 hour trough level indicating dose is accurately prescribed will continue to monitor creatinine for any persistent elevation.     Nina Hewitt, RN, BSN  Solid Organ Transplant, Post Kidney and Pancreas  Transplant Care Coordinator  [phone]         "

## 2024-02-21 DIAGNOSIS — Z94.0 KIDNEY REPLACED BY TRANSPLANT: ICD-10-CM

## 2024-02-21 DIAGNOSIS — Z94.83 PANCREAS REPLACED BY TRANSPLANT (H): Primary | ICD-10-CM

## 2024-02-21 RX ORDER — TACROLIMUS 0.5 MG/1
0.5 CAPSULE, GELATIN COATED ORAL EVERY MORNING
Qty: 30 CAPSULE | Refills: 0 | Status: CANCELLED | OUTPATIENT
Start: 2024-02-21

## 2024-02-21 NOTE — TELEPHONE ENCOUNTER
RNCC received request from [facility] to refill Prograf 0.5 mg capsules (total dose 1.5 mg BID). Prescription last filled 6 years ago by Dr. Tom Whitley. Patient does not have transplant nephrology appt until April 19, 2024 to re-establish post-pancreas transplant care and help with immunosuppression management at the [facility]. Appreciate [facility] continuing to prescribe Prograf until care re-established. [facility] Pharmacist states they will reach out to [facility].     Nina Hewitt RN, BSN  Solid Organ Transplant, Post Kidney and Pancreas  Transplant Care Coordinator  [phone]

## 2024-02-21 NOTE — LETTER
OUTPATIENT LABORATORY TEST ORDER     Patient Name: Ronit Rodrigues   YOB: 1970     [facility] MR# [if applicable]: [id number]   Date & Time: February 21, 2024  9:42 AM  Expiration Date: 1 year after date issued      Diagnosis: Kidney Transplant (ICD-10 Z94.0)  Pancreas Transplant (ICD-10 Z94.83)    Aftercare following organ transplant (ICD-10 Z48.288)    Long term use of medications (ICD-10 Z79.899)    Other specified postprocedural states (Z98.890)     We ask your assistance in obtaining the following laboratory tests, which are part of our routine surveillance program for Solid Organ Transplant patients.     Please fax each result to [phone], same day as resulted/available    Critical lab results page [phone]    Monthly   CBC with platelets  Basic Metabolic Panel (Sodium, Potassium, Chloride, CO2, Creatinine, Urea Nitrogen, glucose, Calcium)  Tacrolimus/Prograf/ drug level     Amylase  Lipase    If you have any questions please call the Transplant Center at [phone]. All lab results should be faxed to [phone]    .

## 2024-04-14 ENCOUNTER — HEALTH MAINTENANCE LETTER (OUTPATIENT)
Age: 54
End: 2024-04-14

## 2024-05-29 ENCOUNTER — TELEPHONE (OUTPATIENT)
Dept: SURGERY | Facility: CLINIC | Age: 54
End: 2024-05-29
Payer: COMMERCIAL

## 2024-05-29 NOTE — TELEPHONE ENCOUNTER
Left VM message with [employer] Assistant of patient's clinic appointment with Dr English on 7/10 at 8 a.m., in person visit.

## 2024-05-29 NOTE — TELEPHONE ENCOUNTER
REFERRAL INFORMATION:  Referring Provider:   Referring Clinic:   Reason for Visit/Diagnosis: Ventral Hernia       FUTURE VISIT INFORMATION:  Appointment Date: 7/10/2024  Appointment Time: 8 AM     NOTES RECORD STATUS  DETAILS   OFFICE NOTE from Referring Provider N/A    OFFICE NOTE from Other Specialists Care Everywhere / Internal [facility]:  4/20/23 - WEIGHT OV with Dr. Edwards    [facility]  [facility] - GEN SURG OV with Dr. Tarango  12/13/21 - SOT OV with Dr. Goldman    [facility]:  10/10/22 - BARIATRIC OV with Mami Coker RD  8/1/19 - GEN SURG OV with Dr. Fowler   [facility] DISCHARGE SUMMARY/ ED VISITS  Internal [facility]:  11/28/22 - Admission with Dr. Goldman   OPERATIVE REPORT N/A    PERTINENT LABS Care Everywhere / Internal    IMAGING (CT, MRI, US, XR)  [facility]  [facility] - CT Abd/pelvis

## 2024-07-10 ENCOUNTER — PRE VISIT (OUTPATIENT)
Dept: SURGERY | Facility: CLINIC | Age: 54
End: 2024-07-10

## 2024-09-18 ENCOUNTER — MYC MEDICAL ADVICE (OUTPATIENT)
Dept: TRANSPLANT | Facility: CLINIC | Age: 54
End: 2024-09-18

## 2024-11-01 ENCOUNTER — TELEPHONE (OUTPATIENT)
Dept: NEPHROLOGY | Facility: CLINIC | Age: 54
End: 2024-11-01
Payer: COMMERCIAL

## 2024-11-01 ENCOUNTER — NURSE TRIAGE (OUTPATIENT)
Dept: NURSING | Facility: CLINIC | Age: 54
End: 2024-11-01

## 2024-11-02 ENCOUNTER — TELEPHONE (OUTPATIENT)
Dept: TRANSPLANT | Facility: CLINIC | Age: 54
End: 2024-11-02
Payer: COMMERCIAL

## 2024-11-02 NOTE — TELEPHONE ENCOUNTER
Ronit called to report that lab did not receive the fax.    Previous fax [phone]    The new fax number [phone], [phone] telephone.    Refaxed a new order.

## 2024-11-02 NOTE — TELEPHONE ENCOUNTER
Ronit paged that she was asking for the magnesium and lipid panel to be faxed to the lab.    [phone] fax.    Order faxed.

## 2024-11-02 NOTE — LETTER
DATE: 2024                    PATIENT NAME: Ronit Rodrigues   : 1970   [facility] MR# [if applicable]: [id number]     DIAGNOSIS: Kidney Transplant (ICD-10  Z94.0)  Long term use of medications (ICD-10  Z79.899)            Please add to specimen from 24 or please draw with next set of labs    Magnesium  Lipid panel          .       Please fax these results to [phone]. Any questions please call [phone]

## 2024-11-02 NOTE — TELEPHONE ENCOUNTER
Nurse Triage SBAR    Is this a 2nd Level Triage? YES, LICENSED PRACTITIONER REVIEW IS REQUIRED    Situation: Kidney transplant pt. Reports having standing lab orders from [facility].   She is requesting additional labs be added for blood that was drawn earlier today at [facility] in [place]     Background:   Ronit is a Kidney transplant pt of Dr. Dutta.  She states that she has standing lab orders from [facility].   She is also seen by [facility].    She had lab work drawn today (orders from Dr. Dutta)    She is requesting that Lipids & Mg be ordered to be run on the blood that was already collected in order to avoid another venipuncture.    Assessment:   Ronit reports recently having decreased energy and becoming more easily winded.  Pt wonders if her symptoms are due to elevated Lipids.    She also reports that Lipids & Mg have always been a part of her standing lab orders.    Protocol Recommended Disposition:    Now    Recommendation: Please contact pt.     8:57 pm - Warm tx'd caller to page , Yanci, to have on-call Nephrology provider paged to pt.    Does the patient meet one of the following criteria for ADS visit consideration? No    Precious Rivas, CHAGO  [facility] Nurse [facility]      Reason for Disposition   [1] Follow-up call from patient regarding patient's clinical status AND [2] information urgent    Protocols used: PCP Call - No Triage-A-

## 2024-11-02 NOTE — TELEPHONE ENCOUNTER
Patient called me to add on mag and lipids panel to her bmp drawn today  I called her outside of hospital lab to see if they can add it on

## 2024-11-03 ENCOUNTER — TELEPHONE (OUTPATIENT)
Dept: TRANSPLANT | Facility: CLINIC | Age: 54
End: 2024-11-03
Payer: COMMERCIAL

## 2024-11-03 NOTE — TELEPHONE ENCOUNTER
Ronit, called to state that they did receive the fax yesterday still. Refaxed to [phone]    Ronit will call back with any updates or changes.

## 2024-11-03 NOTE — TELEPHONE ENCOUNTER
Ronit, called to state that they still are not receiving the fax. She reports that they took her order from the 2 times it was sent to her in [place] [employer].

## 2024-11-07 NOTE — TELEPHONE ENCOUNTER
"ISSUE:   MyChart message from Ronit with request to reschedule her missed 7/15/24 appt w/Dr. Durant for ventral hernia repair. Ronit notes:     \"I'm overweight and have been. 5\"5 210-220lbs.  Lower abdomen hernia.     Need to see you. Need appointment. Will look into it.\"     PLAN:   Loop in SOT Clinic RN team given hernia repair is non-transplant surgery.    Background:   Transplant gave recommendations back on 11/15/23 (see encounter notes) re-imaging with CT scan and first appt with Sol Shaver for her ventral hernia but ultimately weight management referral and complex hernia group referral. She then did not schedule CT at [place], did not schedule with Sol after schedulers went on to make max attempts and sent letter. She then did not show to appt with surgeon Dr. Steven Roy and cancelled 9/11/24. She does not closely follow with SOT at [facility]. Her general nephrologist, Dr. Beto Saul prescribes her immunosuppression medication. Periodically we assist with some lab orders.     Nina Hewitt, RN, BSN, CCTN  Solid Organ Transplant, Post Kidney and Pancreas  Transplant Care Coordinator  [phone]       "

## 2024-11-08 RX ORDER — BACLOFEN 20 MG
TABLET ORAL
COMMUNITY

## 2024-11-08 NOTE — TELEPHONE ENCOUNTER
"RNCC spoke with Ronit regarding her labwork from November 1. Post kidney and pancreas transplant labs are stable with exception of inaccurate tacrolimus trough timing for the drug level. The slightly elevated cholesterol may be due to eating toast/coffee morning of labs but she also states she hasn't been eating the best and could improve her diet and exercise. Encouraged her to discuss further with PCP. Glucose level below 100; A1C not recently checked but previously 5.1.     Ronit states she is taking over the counter [employer] B[employer] Magnesium oxide 500 mg once daily and she sleeps better and overall feels better. Mg level 1.9. Okay to continue supplement. Hold if have diarrhea. Discuss the different magnesium supplements on the market with pharmacist.     Ronit is looking forward to having visit with Dr. Durant Monday. She reports \"having extremely full feeling and tightness in abdomen from 2 transplanted organs and hernia which she has lived with since 2012. Reports feeling uncomfortable from neck to hips and feels it has affected her bone structure in the way she stands/carrys herself. She will discuss further with surgeon. Feels like maybe she needs additional specialist for the bones (orthopedist?) and GI provider.     Nina Hewitt, RN, BSN, CCTN  Solid Organ Transplant, Post Kidney and Pancreas  Transplant Care Coordinator  [phone]         "

## 2024-11-11 ENCOUNTER — OFFICE VISIT (OUTPATIENT)
Dept: TRANSPLANT | Facility: CLINIC | Age: 54
End: 2024-11-11
Attending: SURGERY
Payer: MEDICARE

## 2024-11-11 VITALS
DIASTOLIC BLOOD PRESSURE: 73 MMHG | WEIGHT: 237.3 LBS | SYSTOLIC BLOOD PRESSURE: 120 MMHG | OXYGEN SATURATION: 95 % | BODY MASS INDEX: 40.1 KG/M2 | HEART RATE: 87 BPM

## 2024-11-11 DIAGNOSIS — G89.29 CHRONIC BILATERAL LOW BACK PAIN WITHOUT SCIATICA: Primary | ICD-10-CM

## 2024-11-11 DIAGNOSIS — M54.50 CHRONIC BILATERAL LOW BACK PAIN WITHOUT SCIATICA: Primary | ICD-10-CM

## 2024-11-11 NOTE — PROGRESS NOTES
Transplant Surgery Clinic Note    S: She is hear with complaints of lower back pain that likely originate from her large mouth chronic ventral hernia which causes her to feel heavy.   aft function remains excellent. She follows up today for evaluation of large incisional hernia. She previously had this repaired with mesh in 2012 (ventrio ST lower, ventralex ST higher). She had a fairly prompt recurrence of hernia due to abdominal straining. She did see the complex hernia group here for consideration of repair some years ago (Isaac Tarango and Noreen). She was advised to lose weight, either on her own or with bariatric surgery intervention. She did lose the weight on her own but then decided against hernia repair.     She feels that her hernia has been growing since that time and is interested in repair. She is additionally troubled by feelings of back pain and abdominal 'instability' in core strength. She is on tac/mpa immsx maintenance and is a never smoker.  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          Transplant Date:  10/18/2008 (Kidney / Pancreas)  Baseline Cr: 0.96  DeNovo DSA: No     Acute Rejection Hx:  No     Present Maintenance Immunosuppression:  Tacrolimus and Mycophenolic acid     Transplant Coordinator: Lubna Perez             Transplant Office Phone Number: [phone]        Exam:  /73   Pulse 87   Wt 107.6 kg (237 lb 4.8 oz)   SpO2 95%   BMI 40.10 kg/m      NCAT  NAD  EWOB  RRR  Abdomen - large ventral hernia  NFD  EWOB  RRR    Plan:  Discussed weight loss and then hernia repair  Will refer to PMR for back and SI joint pain/hip pain, ultimately we think this will improve with weight loss and hernia repair, but in the mean time there may be some symptomatic relief through offerings from our PMR colleagues  Will refer to Dr. Evans for sleeve and hernia repair. She was set to do a sleeve a few years ago, and then had  cold-feet, decided to change her plan and just manage her weight with diet, but now she has gained weight again and the hernia causes back pain.   Otherwise doing well    Narciso Concepcion MD, PhD  Abdominal Transplant Surgery Fellow

## 2024-11-11 NOTE — LETTER
11/11/2024      Ronit Rodrigues  [address]  [place] [address]      Dear Colleague,    Thank you for referring your patient, Ronit Rodrigues, to the [facility]. Please see a copy of my visit note below.    Transplant Surgery Clinic Note    S: She is hear with complaints of lower back pain that likely originate from her large mouth chronic ventral hernia which causes her to feel heavy.   aft function remains excellent. She follows up today for evaluation of large incisional hernia. She previously had this repaired with mesh in 2012 (ventrio ST lower, ventralex ST higher). She had a fairly prompt recurrence of hernia due to abdominal straining. She did see the complex hernia group here for consideration of repair some years ago (Isaac Tarango and Noreen). She was advised to lose weight, either on her own or with bariatric surgery intervention. She did lose the weight on her own but then decided against hernia repair.     She feels that her hernia has been growing since that time and is interested in repair. She is additionally troubled by feelings of back pain and abdominal 'instability' in core strength. She is on tac/mpa immsx maintenance and is a never smoker.  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          Transplant Date:  10/18/2008 (Kidney / Pancreas)  Baseline Cr: 0.96  DeNovo DSA: No     Acute Rejection Hx:  No     Present Maintenance Immunosuppression:  Tacrolimus and Mycophenolic acid     Transplant Coordinator: Lubna Perez             Transplant Office Phone Number: [phone]        Exam:  /73   Pulse 87   Wt 107.6 kg (237 lb 4.8 oz)   SpO2 95%   BMI 40.10 kg/m      NCAT  NAD  EWOB  RRR  Abdomen - large ventral hernia  NFD  EWOB  RRR    Plan:  Discussed weight loss and then hernia repair  Will refer to PMR for back and SI joint pain/hip pain, ultimately we think this will improve with weight  loss and hernia repair, but in the mean time there may be some symptomatic relief through offerings from our PMR colleagues  Will refer to Dr. Evans for sleeve and hernia repair. She was set to do a sleeve a few years ago, and then had cold-feet, decided to change her plan and just manage her weight with diet, but now she has gained weight again and the hernia causes back pain.   Otherwise doing well    Narciso Concepcion MD, PhD  Abdominal Transplant Surgery Fellow      Again, thank you for allowing me to participate in the care of your patient.        Sincerely,        Delfin Durant MD

## 2024-11-18 ENCOUNTER — TELEPHONE (OUTPATIENT)
Dept: ENDOCRINOLOGY | Facility: CLINIC | Age: 54
End: 2024-11-18
Payer: COMMERCIAL

## 2024-11-18 NOTE — TELEPHONE ENCOUNTER
Left Voicemail (1st Attempt) for the patient to call back and schedule the following:    Appointment type: Return Ivan / Re -establish care  Provider: Next available CA   Return date: Next new [facility] slot  Specialty phone number: Direct line  Additional appointment(s) needed: n/a  Additonal Notes: Re-reestablish for Dr Evans

## 2024-11-20 ENCOUNTER — TELEPHONE (OUTPATIENT)
Dept: ENDOCRINOLOGY | Facility: CLINIC | Age: 54
End: 2024-11-20
Payer: COMMERCIAL

## 2024-11-20 NOTE — TELEPHONE ENCOUNTER
Patient confirmed scheduled appointment:     Date: 11/21/2024  Time: 10Am  Visit type: Return Bariatric  Visit mode: Virtual Visit  Provider:  Smita Oh PA-C  Location: [facility]    Additional Notes: Reestablish time line, new ricarda slot OK per provider

## 2024-11-21 ENCOUNTER — TELEPHONE (OUTPATIENT)
Dept: ENDOCRINOLOGY | Facility: CLINIC | Age: 54
End: 2024-11-21

## 2024-11-21 NOTE — TELEPHONE ENCOUNTER
"Called and spoke with patient in regards to appointment scheduled with Smita Oh PA-C today. She was incorrectly scheduled for 30 minutes as a return visit. She is a new patient and needs a 60 minute time slot. Patient was upset that she couldn't be seen now (10:12am when scheduled at 10am) for a shorter visit. Advised that her appointment was already 12 minutes in of 30 minutes, she hadn't answered correct questionnaires or been checked in. Will need to reschedule. Patient states \"you people can't do anything right\". Writer apologized for the error in scheduling and offered a new appointment day less than 2 weeks out. Patient is scheduled on 12/2/24 at 11am for a video visit. Patient wants this as a telephone visit. Patient was informed that in person or video visits are preferred, especially for a new consult, and that telephone visits are not used unless there is a connection issue. An in person appointment was offered at 2 different times, and patient requested a virtual visit. She will need to complete a video visit on the day of appointment.   "

## 2024-12-02 ENCOUNTER — VIRTUAL VISIT (OUTPATIENT)
Dept: ENDOCRINOLOGY | Facility: CLINIC | Age: 54
End: 2024-12-02
Payer: COMMERCIAL

## 2024-12-02 VITALS — HEIGHT: 65 IN | WEIGHT: 230 LBS | BODY MASS INDEX: 38.32 KG/M2

## 2024-12-02 DIAGNOSIS — G47.30 SEVERE SLEEP APNEA: ICD-10-CM

## 2024-12-02 DIAGNOSIS — E66.01 MORBID OBESITY (H): ICD-10-CM

## 2024-12-02 DIAGNOSIS — E78.5 DYSLIPIDEMIA: ICD-10-CM

## 2024-12-02 DIAGNOSIS — E66.812 CLASS 2 SEVERE OBESITY WITH SERIOUS COMORBIDITY AND BODY MASS INDEX (BMI) OF 38.0 TO 38.9 IN ADULT, UNSPECIFIED OBESITY TYPE (H): Primary | ICD-10-CM

## 2024-12-02 DIAGNOSIS — E66.01 CLASS 2 SEVERE OBESITY WITH SERIOUS COMORBIDITY AND BODY MASS INDEX (BMI) OF 38.0 TO 38.9 IN ADULT, UNSPECIFIED OBESITY TYPE (H): Primary | ICD-10-CM

## 2024-12-02 DIAGNOSIS — Z94.83 PANCREAS REPLACED BY TRANSPLANT (H): ICD-10-CM

## 2024-12-02 DIAGNOSIS — Z94.0 KIDNEY REPLACED BY TRANSPLANT: ICD-10-CM

## 2024-12-02 PROBLEM — M24.551 HIP FLEXOR TIGHTNESS, RIGHT: Status: ACTIVE | Noted: 2024-12-02

## 2024-12-02 PROBLEM — Z87.19 HISTORY OF ACUTE PANCREATITIS: Status: ACTIVE | Noted: 2024-01-11

## 2024-12-02 PROBLEM — Z87.19 HISTORY OF SMALL BOWEL OBSTRUCTION: Status: ACTIVE | Noted: 2024-01-11

## 2024-12-02 PROBLEM — G56.02 CARPAL TUNNEL SYNDROME OF LEFT WRIST: Status: ACTIVE | Noted: 2021-01-25

## 2024-12-02 PROBLEM — M79.642 BILATERAL HAND PAIN: Status: ACTIVE | Noted: 2017-10-31

## 2024-12-02 PROBLEM — Z72.821 INADEQUATE SLEEP HYGIENE: Status: ACTIVE | Noted: 2022-04-19

## 2024-12-02 PROBLEM — M79.641 BILATERAL HAND PAIN: Status: ACTIVE | Noted: 2017-10-31

## 2024-12-02 PROCEDURE — G2211 COMPLEX E/M VISIT ADD ON: HCPCS | Mod: 95

## 2024-12-02 PROCEDURE — 99417 PROLNG OP E/M EACH 15 MIN: CPT | Mod: 95

## 2024-12-02 PROCEDURE — 99205 OFFICE O/P NEW HI 60 MIN: CPT | Mod: 95

## 2024-12-02 RX ORDER — BUPROPION HYDROCHLORIDE 150 MG/1
150 TABLET ORAL EVERY MORNING
Qty: 90 TABLET | Refills: 3 | Status: SHIPPED | OUTPATIENT
Start: 2024-12-02

## 2024-12-02 RX ORDER — PHENTERMINE HYDROCHLORIDE 37.5 MG/1
TABLET ORAL
Qty: 90 TABLET | Refills: 3 | Status: SHIPPED | OUTPATIENT
Start: 2024-12-02

## 2024-12-02 NOTE — NURSING NOTE
Is the patient currently in the state of [place]? YES    Location: home    Visit mode:VIDEO    If the visit is dropped, the patient can be reconnected by: VIDEO VISIT: Text to cell phone:   Telephone Information:   Mobile [phone]       Will anyone else be joining the visit? NO  (If patient encounters technical issues they should call [phone])    Are changes needed to the allergy or medication list? No    Are refills needed on medications prescribed by this physician? NO    Reason for visit: Consult      Dottie Rodriguez, Virtual Visit Facilitator    QNR Status: completed

## 2024-12-02 NOTE — PROGRESS NOTES
"Virtual Visit Details    Type of service:  Video Visit   Video Start Time:  11:10am  Video End Time: 12:03pm    Originating Location (pt. Location): Home    Distant Location (provider location):  On-site  Platform used for Video Visit: AmWell        84 minutes spent by me on the date of the encounter doing chart review, history and exam, documentation and further activities per the note    New Bariatric Surgery Consultation Note    2024    RE: Ronit Rodrigues  MR#: [id number]  : 1970      Referring provider:       2024    10:10 AM   --   Who referred you Dr Pacheco       Chief Complaint/Reason for visit: evaluation for possible weight loss surgery    Dear Skyla Srinivasan MD (General),    I had the pleasure of seeing your patient, Ronit Rodrigues, to evaluate her obesity and consider her for possible weight loss surgery. As you know, Ronit Rodrigues is 54 year old.  She has a height of 5' 5\", a weight of 230 lbs 0 oz, and calculated Body mass index is 38.27 kg/m .    Assessment & Plan   Problem List Items Addressed This Visit       Dyslipidemia    Relevant Medications    buPROPion (WELLBUTRIN XL) 150 MG 24 hr tablet    Other Relevant Orders    Hemoglobin A1c    Vitamin D Deficiency    Vitamin B12    Vitamin A    Parathyroid Hormone Intact    Comprehensive metabolic panel    Kidney replaced by transplant    Relevant Orders    Adult Cardiology Eval  Referral    Morbid obesity (H)    Relevant Medications    phentermine (ADIPEX-P) 37.5 MG tablet    Pancreas replaced by transplant (H)    Relevant Orders    Adult Cardiology Eval  Referral     Other Visit Diagnoses       Class 2 severe obesity with serious comorbidity and body mass index (BMI) of 38.0 to 38.9 in adult, unspecified obesity type (H)    -  Primary    Relevant Medications    phentermine (ADIPEX-P) 37.5 MG tablet    buPROPion (WELLBUTRIN XL) 150 MG 24 hr tablet    Other Relevant Orders    Hemoglobin A1c    " Vitamin D Deficiency    Vitamin B12    Vitamin A    Parathyroid Hormone Intact    Comprehensive metabolic panel    Adult Cardiology Yunal Wilmer Referral    Severe sleep apnea        Relevant Medications    buPROPion (WELLBUTRIN XL) 150 MG 24 hr tablet    Other Relevant Orders    Hemoglobin A1c    Vitamin D Deficiency    Vitamin B12    Vitamin A    Parathyroid Hormone Intact    Comprehensive metabolic panel                     HISTORY OF PRESENT ILLNESS:      11/21/2024    10:10 AM   Weight Loss History Reviewed with Patient   How long have you been overweight? Following one or more pregnancies   What is the most that you have ever weighed 237   What is the most weight you have lost? 40   I have tried the following methods to lose weight Watching portions or calories    Prescription Medications   I have tried the following weight loss medications? (Check all that apply) Phentermine/Adipex-p/Suprenza     Overweight onset 2022.   Weight gain has been yo-yoing for the last 10+ years. Has been able to lose 40lbs in the past in [id number] in working with medical weight management and Corinne Saul , but has since seen weight regain. Has been taking phentermine off and on since 2015, stopped taking a few months ago and has seen weight gain from 218 to 230lbs which is her highest weight in life.     Comorbidities include hx pancreas and kidney transplant in 2007, history of HTN (resolved since transplant), severe SUSIE (uses cpap intermittently), elevated cholesterol.   Additionally has recurrent large ventral hernia that is immensely uncomfortable for her, advised goal weight of 190lbs in the past by Dr. Tarango for hernia repair.     Per Dr. Tarango in last visit 12/21/22 for surgery consult   .  History of kidney pancreas transplant in 2008 and a ventral hernia repair in 2012 done laparoscopically at [facility].  She did develop a recurrence.  She has a body mass index of 36.61 and she is seen this in the  "past for repair of this.  She is very eager to have this repaired and understands that she should lose some weight in anticipation of this.  She was recently admitted to the hospital in late November.  With abdominal pain nausea and vomiting.  Patient was diagnosed with acute pancreatitis ultimately discharged home and is following up now for next steps.  Plan at this point will be to have the patient actively target 30 pound weight loss.  I do not think that given the strength of some of the newer medications for weight loss that we would necessarily need to do a bariatric procedure but rather she might do well with initial attempts at medication management.  We will need to carefully consider the fact that she has pancreatitis in the past.     Motivators include qualifying for hernia repair, improve quality of life, improve     She would like to pursue bariatric surgery for sustainable weight loss.    Regarding eating patterns and diet, she typically eats 1 main meal plus snacks throughout the day, feels she particularly struggles with eating late into the night. Craves sweets. Is generally able to get full. Generally is able to stay full. Eats out/ gets take out maybe 2-3 times a week.     Is not sure if she struggles with portion control. Does experience food noise. Does not experience emotional eating. Does not experience a loss of control around eating.    Drinks water-sometimes flavored with quarter cup of juice. Milk. \"Ice\" sugar free drinks from [employer]. Pop seldomly- if she has it she'll have regular sprite, doesn't like diet pop. Coffee a couple times a month, made at home with creamer. No ETOH.     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 Has 4 dogs, very busy taking care of them. Likes shopping, cooking, reading. No structured exercise due to discomfort with her large hernia.       Past AOMs   Phentermine- successful weight loss when she takes it, taking " it intermittently.       CO-MORBIDITIES OF OBESITY INCLUDE:      2024    10:10 AM   --   I have the following health issues associated with obesity None of the above             SUSIE? Severe SUSIE diagnosed ,  AHI of 38. See study completed 22      History of bleeding or clotting disorder? No     Is patient on biologics or immunomodulators? Yes- prograf (tacrolimus) and mycophenolic acid        PAST MEDICAL HISTORY:  Past Medical History:   Diagnosis Date    Acute pancreatitis 2015    Asthma 2013    Back pain 2014    Clinical diagnosis of COVID-19 2022    Diabetes (H)     history of diabetes    Dyslipidemia     Resolved after transplant    Encounter for long-term (current) use of medications     History of blood transfusion     Hypertension     Resolved after kidney transplant    Hypokalemia 6/10/2015    Hypomagnesemia 6/10/2015    Kidney replaced by transplant     Pancreas replaced by transplant (H)     Rash     on legs; boils    Reflux pancreatitis of transplanted pancreas 6/10/2015    Renal disease     history of ESRD    Ventral hernia 2015         PAST SURGICAL HISTORY:  Past Surgical History:   Procedure Laterality Date    CATARACT IOL, RT/LT       SECTION          HERNIA REPAIR      Dr. Fowler. [facility]    retinal surgery with laser      SOFT TISSUE SURGERY  2012    lipoma    TRANSPLANT  2008    kidney pancreas transplant    VITRECTOMY ANTERIOR     -c section   -kidney and pancreas transplant   -Ventral hernia repair     FAMILY HISTORY:   Family History   Problem Relation Age of Onset    Diabetes Paternal Grandmother        SOCIAL HISTORY:       2024    10:10 AM   Social History Questions Reviewed With Patient   Which best describes your employment status (select all that apply) I work part-time   If you work, what is your occupation?    Which best describes your marital status single   Who do you have in your support network that  can be available to help you for the first 2 weeks after surgery? Not really   Who can you count on for support throughout your weight loss surgery journey? Family     Nicotine use -denies   Alcohol use - denies   Marijauna use -denies       HABITS:      11/21/2024    10:10 AM   --   How often do you drink alcohol? Never   Do you currently use any of the following Nicotine products? No   Have you ever used any of the following nicotine products? No   Have you or are you currently using street drugs or prescription strength medication for which you do not have a prescription for? No   Do you have a history of chemical dependency (alcohol or drug abuse)? No     Currently taking narcotic/opioids No      PSYCHOLOGICAL HISTORY:       11/21/2024    10:10 AM   Psychological History Reviewed With Patient   Have you ever attempted suicide? Never.   Have you had thoughts of suicide in the past year? No   Have you ever been hospitalized for mental illness or a suicide attempt? Never.   Do you have a history of chronic pain? No   Have you ever been diagnosed with fibromyalgia? No   Are you currently being treated for any of the following? (select all that apply) I do not have a mental illness     Denies any history of mental health issues.   ROS:      11/21/2024    10:10 AM   --   Skin Skin fold rashes (groin or other folds)    Varicose veins   HEENT Teeth, dentures, or bridges needing repairs   Musculoskeletal Joint Pain    Back pain    Limited mobility   Cardiovascular None of the above   Pulmonary People have told me I stop breathing while asleep   Gastrointestinal Heartburn   Genitourinary None of the above   Hematological History of blood transfusions   Neurological None of the above   Female only Loss of menstrual cycles    Post-menopausal   - needing to get crowns on her teeth on top right and bottom left  - right now is able to chew to an applesauce like consistency     GI specific ROS   GERD  heart burn symptoms 2x a  month   History of peptic ulcer disease No  Dysphagia -denies   Vomiting No  Melena No  Hematochezia No  Last EGD 2008- shortly after kidney and pancreas transplant.     EATING BEHAVIORS:      11/21/2024    10:10 AM   --   Have you or anyone else thought that you had an eating disorder? No   Do you currently binge eat (eat a large amount of food in a short time)? No   Are you an emotional eater? No   Do you get up to eat after falling asleep? No   Can you afford 3 meals a day? Yes   Can you afford 50-60 dollars a month for vitamins? No   Is currently taking a multivitamin approved through insurance.       EXERCISE:      11/21/2024    10:10 AM   --   How often do you exercise? Less than 1 time per week   What is the duration of your exercise (in minutes)? 15 Minutes   What types of exercise do you do? walking   What keeps you from being more active? I should be more active but I just have not gotten around to it    My ability to walk or move around is limited         MEDICATIONS:  Current Outpatient Medications   Medication Sig Dispense Refill    buPROPion (WELLBUTRIN XL) 150 MG 24 hr tablet Take 1 tablet (150 mg) by mouth every morning. 90 tablet 3    chlorthalidone (HYGROTEN) 25 MG tablet Take 1 tablet by mouth daily. 32 tablet 0    mycophenolic acid (MYFORTIC - GENERIC EQUIVALENT) 360 MG EC tablet Take 1 tablet (360 mg) by mouth 2 times daily Annual appointment needed for medication refills 60 tablet 0    phentermine (ADIPEX-P) 37.5 MG tablet Take 1/2 tablet every morning with breakfast. Increase to 1 tablet after 1 week if needed. 90 tablet 3    PROGRAF 0.5 MG PO CAPSULE Take 1 capsule (0.5 mg) by mouth every morning Take with 1 mg capsule. Total dose is 1.5 mg every morning and 1 mg every evening. 30 capsule 3    PROGRAF 1 MG PO CAPSULE Take 1 capsule (1 mg) by mouth 2 times daily Take with 0.5 mg capsules. Total dose is 1.5 mg every morning and 1 mg every evening. 60 capsule 3    albuterol (PROAIR HFA/PROVENTIL  "HFA/VENTOLIN HFA) 108 (90 Base) MCG/ACT inhaler Inhale 1-2 puffs into the lungs every 4 hours as needed for wheezing      Magnesium Oxide -Mg Supplement 500 MG TABS Take by mouth.      Prenatal Vit-Fe Fumarate-FA (PNV PRENATAL PLUS MULTIVITAMIN) 27-1 MG TABS Take 1 tablet by mouth daily 30 tablet 11    vitamin D3 (CHOLECALCIFEROL) 50 mcg (2000 units) tablet Take 2 tablets by mouth daily             ALLERGIES:  Allergies   Allergen Reactions    Iodinated Contrast Media Hives    Apple Juice     Contrast Dye Hives               4/19/2023     3:56 PM 11/21/2024     9:56 AM   CARLA Score (Last Two)   CARLA Raw Score 30 31    Activation Score 56 59.3    CARLA Level 3 3        Patient-reported       Computed FIB-4 Calculation unavailable. One or more values for this score either were not found within the given timeframe or did not fit some other criterion.    Fib-4 < 1.3: No further evaluation at this point, unless other concerns    - If the Fib-4 is >2.67  Fibroscan and elective liver clinic referral    - Intermediate Fib-4 scores: Get a Fibroscan, consider repeating this in 1-2 years.    Anti-obesity medication ROS:    HEENT  Hx of glaucoma: No . Has had retinal detachment in the past     Cardiovascular  CAD:No  HTN:Yes    Gastrointestinal  GERD: in the past   Constipation:No  Liver Dz:No  H/O Pancreatitis:Yes    Psychiatric  Bipolar: No  Anxiety:No  Depression:No  Suicidal Ideation: No  History of alcohol/drug abuse: No  Hx of eating disorder:No    Endocrine  Personal or family hx of MTC or MEN2:No  Diabetes/prediabetes:  type 1 diabetes prior to pancreas transplant     Neurologic:  Hx of seizures: No  Hx of migraines: No  Memory Impairment: No  CVA history: No        History of kidney stones:  unsure, possibly more than 10 years ago   Kidney disease:  s/p transplant   ,chronic kidney disease prior to this.    Current birth control: post menopausal         Objective    Ht 1.651 m (5' 5\")   Wt 104.3 kg (230 lb)   BMI 38.27 " kg/m           Vitals:  No vitals were obtained today due to virtual visit.    Physical Exam   GENERAL: alert and no distress  EYES: Eyes grossly normal to inspection.  No discharge or erythema, or obvious scleral/conjunctival abnormalities.  RESP: No audible wheeze, cough, or visible cyanosis.    SKIN: Visible skin clear. No significant rash, abnormal pigmentation or lesions.  NEURO: Cranial nerves grossly intact.  Mentation and speech appropriate for age.  PSYCH: Appropriate affect, tone, and pace of words        In summary, Ronit Rodrigues has Class III obesity with a body mass index of Body mass index is 38.27 kg/m . kg/m2 and the comorbidities stated above. She completed an informational seminar and is a possible candidate for the laparoscopic gastric sleeve.  She will have to complete the following pre-requisites:    Received weight loss goal of 10 lb prior to surgery.  Achieve clearance from dietitian to see surgeon.  Have preoperative laboratory tests drawn.  Psychological Evaluation with MMPI and clearance for weight loss surgery.  Letter of clearance from the following transplant team (taking tacrolimus, mycophenolic acid), primary care, cardiology (history of type 1 dm), sleep medicine     Today in the office we discussed gastric sleeve surgery. Preoperative, perioperative, and postoperative processes, management, and follow up were addressed.  Risks and benefits were outlined including the risk of death, staple line leak (1-2%), PE, DVT, ulcer, worsening GERD, N/V, stricture, hernia, wound infection, weight regain, and vitamin deficiencies. I emphasized exercise and activity along with appropriate food choice as the main foundation for weight loss with surgery providing surgical reinforcement of this.  All questions were answered.  A goal sheet and support group handout were given to the patient.        If you have not already watched our online seminar please go to  [email or web address]    Weight loss requirement: 10lbs prior to surgery. Goal Weight: 227. Will have final weight check 2-3 weeks prior to surgery at anesthesia or nurse pre-op teaching visit.    -Need current weight confirmation at primary clinic or weight management clinic: No    Bariatric labs ordered, call for a lab only appointment at any [facility] lab. To find a lab location near you, please call [phone]. Please let us know if orders need to be faxed to a non [facility] lab.    Schedule bariatric psych eval as soon as possible.  List of psychologists will be sent to you via [employer] or given to you in clinic.     Call Gama Patino at [phone] to discuss insurance coverage for bariatric surgery.  Please check with your insurance regarding bariatric surgery coverage also. Gama can also help you with scheduling psych eval if you are having difficulties.    The following clearance letters are needed: Letter templates will be sent to you via [employer] or given to you in clinic. Providers can submit through electronic medical record or fax to [phone].  - Primary care provider, transplant team, cardiology, sleep medicine     Smoking cessation and nicotine test needed: No    Birth control after surgery discussed. Patient instructed that 2 forms of birth control required after surgery and to avoid pregnancy for at least 18 months after surgery: post menopausal     NEXT VISITS: A  should reach out to you to schedule the following appointments.  If they do not reach you please call [phone] to schedule the following appointments:    -See dietitian in 1 month and monthly for 3 months    -See Corinne Saul (per patient preference) in 3 months to follow up on pre-op weight loss and weight loss medications    -See Dr. Tarango (per patient preference) in 1 month for bariatric surgeon visit. Discuss bariatric surgery.  Important items to discuss surgery history     -Group  Dietician visit to be scheduled asap    -New bariatric consult class to be scheduled asap    Anti-obesity medication started today for this patient   PHENTERMINE  Has taken in the past - saw very successful weight loss   No history of CVA   No history of cardiovascular disease   No history of cardiac arrhythmias   No history of glaucoma  No history of drug abuse  No history of hyperthyroidism  No concerns for anxiety  Caution would be needed with this medication due to history of HTN, currently well within goal   .  BUPROPION  No history of bipolar disorder  No history of cardiovascular disease   No history of cardiac arrhythmias   No history of seizures  No history of bulimia nervosa  No history of anorexia nervosa  Caution would be needed with this medication due to history of HTN, currently within goal   .     Potential anti-obesity medications for this patient the future if there are issues with cost or side effects  CONTRAVE  No current opioid use  No history of liver disease  No chronic pain  No history of bipolar disorder  No history of cardiovascular disease   No history of cardiac arrhythmias   No history of seizures  No history of bulimia nervosa  No history of anorexia nervosa  Caution would be needed with this medication due to HTN, currently well within goal   .  METFORMIN  GFR >45  .    The following medications could be considered with caution for this patient   TOPIRAMATE  Brain fog, took 10 years ago. Could consider retrialing low dose in the future   No history of kidney stones  No history of glaucoma   No issues with memory  .     Contraindicated/ Failed Weight loss medications for this patient   WEGOVY  Contraindicated due to history of acute pancreatitis   .      Once the patient has completed the requirements in their task list and there are no further recommendations, the pt will be allowed to see the surgeon of their choice for consultation on the laparoscopic gastric sleeve surgery. Patient  verbalizes understanding of the process to surgery and expectations for the postoperative period including the need for lifelong lifestyle changes, vitamin supplementation, and laboratory monitoring.    Medications that may contribute to the patient's obesity, such as antipsychotic medications, have been identified. Correctable endocrine disorders and other medical conditions have been ruled out, or are under successful treatment. Identified personal barriers to making and continuing needed life changes. Identified behavior changes/strategies to address personal barriers.    Sincerely,     Smita Oh PA-C     The longitudinal plan of care for the diagnosis(es)/condition(s) as documented were addressed during this visit. Due to the added complexity in care, I will continue to support Ronit  in the subsequent management and with ongoing continuity of care.

## 2024-12-02 NOTE — LETTER
"2024       RE: Ronit Rodrigues  [address] [address]     Dear Colleague,    Thank you for referring your patient, Ronit Rodrigues, to the [facility] [place] [facility]. Please see a copy of my visit note below.    Virtual Visit Details    Type of service:  Video Visit   Video Start Time:  11:10am  Video End Time: 12:03pm    Originating Location (pt. Location): Home    Distant Location (provider location):  On-site  Platform used for Video Visit: [employer]        84 minutes spent by me on the date of the encounter doing chart review, history and exam, documentation and further activities per the note    New Bariatric Surgery Consultation Note    2024    RE: Ronit Rodrigues  MR#: [id number]  : 1970      Referring provider:       2024    10:10 AM   --   Who referred you Dr Pacheco       Chief Complaint/Reason for visit: evaluation for possible weight loss surgery    Dear Skyla Srinivasan MD (General),    I had the pleasure of seeing your patient, Ronit Rodrigues, to evaluate her obesity and consider her for possible weight loss surgery. As you know, Ronit Rodrigues is 54 year old.  She has a height of 5' 5\", a weight of 230 lbs 0 oz, and calculated Body mass index is 38.27 kg/m .    Assessment & Plan  Problem List Items Addressed This Visit       Dyslipidemia    Relevant Medications    buPROPion (WELLBUTRIN XL) 150 MG 24 hr tablet    Other Relevant Orders    Hemoglobin A1c    Vitamin D Deficiency    Vitamin B12    Vitamin A    Parathyroid Hormone Intact    Comprehensive metabolic panel    Kidney replaced by transplant    Relevant Orders    Adult Cardiology Eval  Referral    Morbid obesity (H)    Relevant Medications    phentermine (ADIPEX-P) 37.5 MG tablet    Pancreas replaced by transplant (H)    Relevant Orders    Adult Cardiology Eval  Referral "     Other Visit Diagnoses       Class 2 severe obesity with serious comorbidity and body mass index (BMI) of 38.0 to 38.9 in adult, unspecified obesity type (H)    -  Primary    Relevant Medications    phentermine (ADIPEX-P) 37.5 MG tablet    buPROPion (WELLBUTRIN XL) 150 MG 24 hr tablet    Other Relevant Orders    Hemoglobin A1c    Vitamin D Deficiency    Vitamin B12    Vitamin A    Parathyroid Hormone Intact    Comprehensive metabolic panel    Adult Cardiology Eval  Referral    Severe sleep apnea        Relevant Medications    buPROPion (WELLBUTRIN XL) 150 MG 24 hr tablet    Other Relevant Orders    Hemoglobin A1c    Vitamin D Deficiency    Vitamin B12    Vitamin A    Parathyroid Hormone Intact    Comprehensive metabolic panel                     HISTORY OF PRESENT ILLNESS:      11/21/2024    10:10 AM   Weight Loss History Reviewed with Patient   How long have you been overweight? Following one or more pregnancies   What is the most that you have ever weighed 237   What is the most weight you have lost? 40   I have tried the following methods to lose weight Watching portions or calories    Prescription Medications   I have tried the following weight loss medications? (Check all that apply) Phentermine/Adipex-p/Suprenza     Overweight onset 2022.   Weight gain has been yo-yoing for the last 10+ years. Has been able to lose 40lbs in the past in [id number] in working with medical weight management and Corinne Saul , but has since seen weight regain. Has been taking phentermine off and on since 2015, stopped taking a few months ago and has seen weight gain from 218 to 230lbs which is her highest weight in life.     Comorbidities include hx pancreas and kidney transplant in 2007, history of HTN (resolved since transplant), severe SUSIE (uses cpap intermittently), elevated cholesterol.   Additionally has recurrent large ventral hernia that is immensely uncomfortable for her, advised goal weight of 190lbs in the  "past by Dr. Tarango for hernia repair.     Per Dr. Tarango in last visit 12/21/22 for surgery consult   .  History of kidney pancreas transplant in 2008 and a ventral hernia repair in 2012 done laparoscopically at [facility].  She did develop a recurrence.  She has a body mass index of 36.61 and she is seen this in the past for repair of this.  She is very eager to have this repaired and understands that she should lose some weight in anticipation of this.  She was recently admitted to the hospital in late November.  With abdominal pain nausea and vomiting.  Patient was diagnosed with acute pancreatitis ultimately discharged home and is following up now for next steps.  Plan at this point will be to have the patient actively target 30 pound weight loss.  I do not think that given the strength of some of the newer medications for weight loss that we would necessarily need to do a bariatric procedure but rather she might do well with initial attempts at medication management.  We will need to carefully consider the fact that she has pancreatitis in the past.     Motivators include qualifying for hernia repair, improve quality of life, improve     She would like to pursue bariatric surgery for sustainable weight loss.    Regarding eating patterns and diet, she typically eats 1 main meal plus snacks throughout the day, feels she particularly struggles with eating late into the night. Craves sweets. Is generally able to get full. Generally is able to stay full. Eats out/ gets take out maybe 2-3 times a week.     Is not sure if she struggles with portion control. Does experience food noise. Does not experience emotional eating. Does not experience a loss of control around eating.    Drinks water-sometimes flavored with quarter cup of juice. Milk. \"Ice\" sugar free drinks from [employer]. Pop seldomly- if she has it she'll have regular sprite, doesn't like diet pop. Coffee a couple times a month, made at home with " creamer. No ETOH.     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 Has 4 dogs, very busy taking care of them. Likes shopping, cooking, reading. No structured exercise due to discomfort with her large hernia.       Past AOMs   Phentermine- successful weight loss when she takes it, taking it intermittently.       CO-MORBIDITIES OF OBESITY INCLUDE:      2024    10:10 AM   --   I have the following health issues associated with obesity None of the above             SUSIE? Severe SUSIE diagnosed ,  AHI of 38. See study completed 22      History of bleeding or clotting disorder? No     Is patient on biologics or immunomodulators? Yes- prograf (tacrolimus) and mycophenolic acid        PAST MEDICAL HISTORY:  Past Medical History:   Diagnosis Date     Acute pancreatitis 2015     Asthma 2013     Back pain 2014     Clinical diagnosis of COVID-19 2022     Diabetes (H)     history of diabetes     Dyslipidemia     Resolved after transplant     Encounter for long-term (current) use of medications      History of blood transfusion      Hypertension     Resolved after kidney transplant     Hypokalemia 6/10/2015     Hypomagnesemia 6/10/2015     Kidney replaced by transplant      Pancreas replaced by transplant (H)      Rash     on legs; boils     Reflux pancreatitis of transplanted pancreas 6/10/2015     Renal disease     history of ESRD     Ventral hernia 2015         PAST SURGICAL HISTORY:  Past Surgical History:   Procedure Laterality Date     CATARACT IOL, RT/LT        SECTION           HERNIA REPAIR      Dr. Vietzen. [facility]     retinal surgery with laser       SOFT TISSUE SURGERY      lipoma     TRANSPLANT      kidney pancreas transplant     VITRECTOMY ANTERIOR     -c section   -kidney and pancreas transplant   -Ventral hernia repair 2012    FAMILY HISTORY:   Family History   Problem Relation Age of Onset      Diabetes Paternal Grandmother        SOCIAL HISTORY:       11/21/2024    10:10 AM   Social History Questions Reviewed With Patient   Which best describes your employment status (select all that apply) I work part-time   If you work, what is your occupation?    Which best describes your marital status single   Who do you have in your support network that can be available to help you for the first 2 weeks after surgery? Not really   Who can you count on for support throughout your weight loss surgery journey? Family     Nicotine use -denies   Alcohol use - denies   Marijauna use -denies       HABITS:      11/21/2024    10:10 AM   --   How often do you drink alcohol? Never   Do you currently use any of the following Nicotine products? No   Have you ever used any of the following nicotine products? No   Have you or are you currently using street drugs or prescription strength medication for which you do not have a prescription for? No   Do you have a history of chemical dependency (alcohol or drug abuse)? No     Currently taking narcotic/opioids No      PSYCHOLOGICAL HISTORY:       11/21/2024    10:10 AM   Psychological History Reviewed With Patient   Have you ever attempted suicide? Never.   Have you had thoughts of suicide in the past year? No   Have you ever been hospitalized for mental illness or a suicide attempt? Never.   Do you have a history of chronic pain? No   Have you ever been diagnosed with fibromyalgia? No   Are you currently being treated for any of the following? (select all that apply) I do not have a mental illness     Denies any history of mental health issues.   ROS:      11/21/2024    10:10 AM   --   Skin Skin fold rashes (groin or other folds)    Varicose veins   HEENT Teeth, dentures, or bridges needing repairs   Musculoskeletal Joint Pain    Back pain    Limited mobility   Cardiovascular None of the above   Pulmonary People have told me I stop breathing while asleep   Gastrointestinal  Heartburn   Genitourinary None of the above   Hematological History of blood transfusions   Neurological None of the above   Female only Loss of menstrual cycles    Post-menopausal   - needing to get crowns on her teeth on top right and bottom left  - right now is able to chew to an applesauce like consistency     GI specific ROS   GERD  heart burn symptoms 2x a month   History of peptic ulcer disease No  Dysphagia -denies   Vomiting No  Melena No  Hematochezia No  Last EGD 2008- shortly after kidney and pancreas transplant.     EATING BEHAVIORS:      11/21/2024    10:10 AM   --   Have you or anyone else thought that you had an eating disorder? No   Do you currently binge eat (eat a large amount of food in a short time)? No   Are you an emotional eater? No   Do you get up to eat after falling asleep? No   Can you afford 3 meals a day? Yes   Can you afford 50-60 dollars a month for vitamins? No   Is currently taking a multivitamin approved through insurance.       EXERCISE:      11/21/2024    10:10 AM   --   How often do you exercise? Less than 1 time per week   What is the duration of your exercise (in minutes)? 15 Minutes   What types of exercise do you do? walking   What keeps you from being more active? I should be more active but I just have not gotten around to it    My ability to walk or move around is limited         MEDICATIONS:  Current Outpatient Medications   Medication Sig Dispense Refill     buPROPion (WELLBUTRIN XL) 150 MG 24 hr tablet Take 1 tablet (150 mg) by mouth every morning. 90 tablet 3     chlorthalidone (HYGROTEN) 25 MG tablet Take 1 tablet by mouth daily. 32 tablet 0     mycophenolic acid (MYFORTIC - GENERIC EQUIVALENT) 360 MG EC tablet Take 1 tablet (360 mg) by mouth 2 times daily Annual appointment needed for medication refills 60 tablet 0     phentermine (ADIPEX-P) 37.5 MG tablet Take 1/2 tablet every morning with breakfast. Increase to 1 tablet after 1 week if needed. 90 tablet 3      PROGRAF 0.5 MG PO CAPSULE Take 1 capsule (0.5 mg) by mouth every morning Take with 1 mg capsule. Total dose is 1.5 mg every morning and 1 mg every evening. 30 capsule 3     PROGRAF 1 MG PO CAPSULE Take 1 capsule (1 mg) by mouth 2 times daily Take with 0.5 mg capsules. Total dose is 1.5 mg every morning and 1 mg every evening. 60 capsule 3     albuterol (PROAIR HFA/PROVENTIL HFA/VENTOLIN HFA) 108 (90 Base) MCG/ACT inhaler Inhale 1-2 puffs into the lungs every 4 hours as needed for wheezing       Magnesium Oxide -Mg Supplement 500 MG TABS Take by mouth.       Prenatal Vit-Fe Fumarate-FA (PNV PRENATAL PLUS MULTIVITAMIN) 27-1 MG TABS Take 1 tablet by mouth daily 30 tablet 11     vitamin D3 (CHOLECALCIFEROL) 50 mcg (2000 units) tablet Take 2 tablets by mouth daily             ALLERGIES:  Allergies   Allergen Reactions     Iodinated Contrast Media Hives     Apple Juice      Contrast Dye Hives               4/19/2023     3:56 PM 11/21/2024     9:56 AM   CARLA Score (Last Two)   CARLA Raw Score 30 31    Activation Score 56 59.3    CARLA Level 3 3        Patient-reported       Computed FIB-4 Calculation unavailable. One or more values for this score either were not found within the given timeframe or did not fit some other criterion.    Fib-4 < 1.3: No further evaluation at this point, unless other concerns    - If the Fib-4 is >2.67  Fibroscan and elective liver clinic referral    - Intermediate Fib-4 scores: Get a Fibroscan, consider repeating this in 1-2 years.    Anti-obesity medication ROS:    HEENT  Hx of glaucoma: No . Has had retinal detachment in the past     Cardiovascular  CAD:No  HTN:Yes    Gastrointestinal  GERD: in the past   Constipation:No  Liver Dz:No  H/O Pancreatitis:Yes    Psychiatric  Bipolar: No  Anxiety:No  Depression:No  Suicidal Ideation: No  History of alcohol/drug abuse: No  Hx of eating disorder:No    Endocrine  Personal or family hx of MTC or MEN2:No  Diabetes/prediabetes:  type 1 diabetes prior to  "pancreas transplant     Neurologic:  Hx of seizures: No  Hx of migraines: No  Memory Impairment: No  CVA history: No        History of kidney stones:  unsure, possibly more than 10 years ago   Kidney disease:  s/p transplant   ,chronic kidney disease prior to this.    Current birth control: post menopausal         Objective   Ht 1.651 m (5' 5\")   Wt 104.3 kg (230 lb)   BMI 38.27 kg/m           Vitals:  No vitals were obtained today due to virtual visit.    Physical Exam   GENERAL: alert and no distress  EYES: Eyes grossly normal to inspection.  No discharge or erythema, or obvious scleral/conjunctival abnormalities.  RESP: No audible wheeze, cough, or visible cyanosis.    SKIN: Visible skin clear. No significant rash, abnormal pigmentation or lesions.  NEURO: Cranial nerves grossly intact.  Mentation and speech appropriate for age.  PSYCH: Appropriate affect, tone, and pace of words        In summary, Ronit Rodrigues has Class III obesity with a body mass index of Body mass index is 38.27 kg/m . kg/m2 and the comorbidities stated above. She completed an informational seminar and is a possible candidate for the laparoscopic gastric sleeve.  She will have to complete the following pre-requisites:    Received weight loss goal of 10 lb prior to surgery.  Achieve clearance from dietitian to see surgeon.  Have preoperative laboratory tests drawn.  Psychological Evaluation with MMPI and clearance for weight loss surgery.  Letter of clearance from the following transplant team (taking tacrolimus, mycophenolic acid), primary care, cardiology (history of type 1 dm), sleep medicine     Today in the office we discussed gastric sleeve surgery. Preoperative, perioperative, and postoperative processes, management, and follow up were addressed.  Risks and benefits were outlined including the risk of death, staple line leak (1-2%), PE, DVT, ulcer, worsening GERD, N/V, stricture, hernia, wound infection, weight regain, and " vitamin deficiencies. I emphasized exercise and activity along with appropriate food choice as the main foundation for weight loss with surgery providing surgical reinforcement of this.  All questions were answered.  A goal sheet and support group handout were given to the patient.        If you have not already watched our online seminar please go to [email or web address]    Weight loss requirement: 10lbs prior to surgery. Goal Weight: 227. Will have final weight check 2-3 weeks prior to surgery at anesthesia or nurse pre-op teaching visit.    -Need current weight confirmation at primary clinic or weight management clinic: No    Bariatric labs ordered, call for a lab only appointment at any [facility] lab. To find a lab location near you, please call [phone]. Please let us know if orders need to be faxed to a non [facility] lab.    Schedule bariatric psych eval as soon as possible.  List of psychologists will be sent to you via [employer] or given to you in clinic.     Call Gama Patino at [phone] to discuss insurance coverage for bariatric surgery.  Please check with your insurance regarding bariatric surgery coverage also. Gama can also help you with scheduling psych eval if you are having difficulties.    The following clearance letters are needed: Letter templates will be sent to you via [employer] or given to you in clinic. Providers can submit through electronic medical record or fax to [phone].  - Primary care provider, transplant team, cardiology, sleep medicine     Smoking cessation and nicotine test needed: No    Birth control after surgery discussed. Patient instructed that 2 forms of birth control required after surgery and to avoid pregnancy for at least 18 months after surgery: post menopausal     NEXT VISITS: A  should reach out to you to schedule the following appointments.  If they do not reach you please call [phone] to schedule the following  appointments:    -See dietitian in 1 month and monthly for 3 months    -See Corinne Saul (per patient preference) in 3 months to follow up on pre-op weight loss and weight loss medications    -See Dr. Tarango (per patient preference) in 1 month for bariatric surgeon visit. Discuss bariatric surgery.  Important items to discuss surgery history     -Group Dietician visit to be scheduled asap    -New bariatric consult class to be scheduled asap    Anti-obesity medication started today for this patient   PHENTERMINE  Has taken in the past - saw very successful weight loss   No history of CVA   No history of cardiovascular disease   No history of cardiac arrhythmias   No history of glaucoma  No history of drug abuse  No history of hyperthyroidism  No concerns for anxiety  Caution would be needed with this medication due to history of HTN, currently well within goal   .  BUPROPION  No history of bipolar disorder  No history of cardiovascular disease   No history of cardiac arrhythmias   No history of seizures  No history of bulimia nervosa  No history of anorexia nervosa  Caution would be needed with this medication due to history of HTN, currently within goal   .     Potential anti-obesity medications for this patient the future if there are issues with cost or side effects  CONTRAVE  No current opioid use  No history of liver disease  No chronic pain  No history of bipolar disorder  No history of cardiovascular disease   No history of cardiac arrhythmias   No history of seizures  No history of bulimia nervosa  No history of anorexia nervosa  Caution would be needed with this medication due to HTN, currently well within goal   .  METFORMIN  GFR >45  .    The following medications could be considered with caution for this patient   TOPIRAMATE  Brain fog, took 10 years ago. Could consider retrialing low dose in the future   No history of kidney stones  No history of glaucoma   No issues with memory  .      Contraindicated/ Failed Weight loss medications for this patient   WEGOVY  Contraindicated due to history of acute pancreatitis   .      Once the patient has completed the requirements in their task list and there are no further recommendations, the pt will be allowed to see the surgeon of their choice for consultation on the laparoscopic gastric sleeve surgery. Patient verbalizes understanding of the process to surgery and expectations for the postoperative period including the need for lifelong lifestyle changes, vitamin supplementation, and laboratory monitoring.    Medications that may contribute to the patient's obesity, such as antipsychotic medications, have been identified. Correctable endocrine disorders and other medical conditions have been ruled out, or are under successful treatment. Identified personal barriers to making and continuing needed life changes. Identified behavior changes/strategies to address personal barriers.    Sincerely,     Smita Oh PA-C     The longitudinal plan of care for the diagnosis(es)/condition(s) as documented were addressed during this visit. Due to the added complexity in care, I will continue to support Ronit  in the subsequent management and with ongoing continuity of care.       Again, thank you for allowing me to participate in the care of your patient.      Sincerely,    Smita Oh PA-C

## 2024-12-02 NOTE — PATIENT INSTRUCTIONS
If you have not already watched our online seminar please go to [email or web address]    Weight loss requirement: 10lbs prior to surgery. Goal Weight: 227. Will have final weight check 2-3 weeks prior to surgery at anesthesia or nurse pre-op teaching visit.    -Need current weight confirmation at primary clinic or weight management clinic: No    Bariatric labs ordered, call for a lab only appointment at any [facility] lab. To find a lab location near you, please call [phone]. Please let us know if orders need to be faxed to a non [facility] lab.    Schedule bariatric psych eval as soon as possible.  List of psychologists will be sent to you via [employer] or given to you in clinic.     Call Gama Patino at [phone] to discuss insurance coverage for bariatric surgery.  Please check with your insurance regarding bariatric surgery coverage also. Gama can also help you with scheduling psych eval if you are having difficulties.    The following clearance letters are needed: Letter templates will be sent to you via [employer] or given to you in clinic. Providers can submit through electronic medical record or fax to [phone].  - Primary care provider, transplant team, cardiology, sleep medicine     Smoking cessation and nicotine test needed: No    Birth control after surgery discussed. Patient instructed that 2 forms of birth control required after surgery and to avoid pregnancy for at least 18 months after surgery: post menopausal     NEXT VISITS: A  should reach out to you to schedule the following appointments.  If they do not reach you please call [phone] to schedule the following appointments:    -See dietitian in 1 month and monthly for 3 months    -See Corinne Saul (per patient preference) in 3 months to follow up on pre-op weight loss and weight loss medications    -See Dr. Tarango (per patient preference) in 1 month for bariatric surgeon visit. Discuss bariatric  surgery.  Important items to discuss surgery history     -Group Dietician visit to be scheduled asap    -New bariatric consult class to be scheduled asap

## 2024-12-03 ENCOUNTER — CARE COORDINATION (OUTPATIENT)
Dept: ENDOCRINOLOGY | Facility: CLINIC | Age: 54
End: 2024-12-03
Payer: COMMERCIAL

## 2024-12-03 NOTE — LETTER
December 3, 2024   Re: Ronit Rodrigues   Address: [address] [address]   : 1970       Dear Sleep Medicine Provider,     Thank you for taking the time to see Ronit for a pre-operative clearance evaluation for bariatric surgery. This patient is referred for clearance because they have known SUSIE, have symptoms of SUSIE or have a BMI > 50 per our program protocol.     Please assist us with the following during your evaluation of our mutual patient:     Initial patient evaluation   Arrange and expedite necessary testing.   Guide and facilitate follow-up treatment.   Provide a letter stating that the patient is cleared to proceed with bariatric surgery from a sleep medicine standpoint.   Indicate what treatment is needed pre-surgery (if any), during inpatient hospitalization and after surgery.     The evaluation must confirm that the patient is stable from a sleep medicine standpoint to proceed with the surgery. If you have any questions, feel free to contact us via our [facility] at [phone]. Please fax the evaluation to the number below.     Sincerely,     Comprehensive Weight Management Team     [facility]   [facility]   [address], [address], [place], [place], [address]     Ph: [phone]   Fax: [phone]

## 2024-12-03 NOTE — LETTER
December 3, 2024   Re: Ronit ADRIAN Lilia   Address: [address] [address]   : 1970       Dear Mental Health Provider,     Thank you for taking the time to see Ronit for a preoperative psychological evaluation for bariatric surgery. The following elements are required by our program for screening patients for bariatric surgery:     1. Intake Assessment   Identifying data: reason for pursuing surgery.   History of Present Illness: weight loss history, typical eating patterns, exercise, and leisure time activities, coping skills.   Psychiatric History: If a mental health condition is present, documented verification is required noting that the condition is it under successful treatment.   Medical History: allergies, emergencies, head traumas, etc.   Family History   Social and Development History: abuse/neglect, level of functioning, education, employment.   Substance Abuse and Addictions: prescription and OTC meds/herbs, alcohol and related substances, nicotine, caffeine/pop, gambling, shopping. Absence of active substance use disorder.   Mental Status: appearance, behavior, mood, and affect, thought content and thought process. Is patient able to provide an informed consent?   Social Network: marriage, children, friends, current living situation. Family and social supports have been assessed, and strategies to strengthen those supports have been recommended.   Legal History       2. MMPI   3. Summary of current findings: emotional stability, conclusions, and recommendations.   4. Follow-up visit to discuss the results of the MMPI or other tests, conclusions, and recommendations.     The evaluation must confirm that the patient is emotionally stable to proceed with the surgery, cognitively capable of understanding the risks and realistic goals of the surgical procedure and prepared to comply with the long-term aftercare and behavioral changes expected after surgery.     We feel it  is important for our patients to establish a relationship with a mental health provider prior to surgery since the changes they experience postoperatively can be overwhelming. Therefore, having an established relationship is essential to help them cope effectively with these life-altering changes.     If you have any questions, feel free to contact us via our [facility] at [phone]. Please fax the evaluation to the number below.     Sincerely,     Comprehensive Weight Management Team     [facility]   [facility]   [address], [address], [place], [place], [address]     Ph: [phone]   Fax: [phone]

## 2024-12-03 NOTE — LETTER
December 3, 2024   Re: Ronit ADRIAN Lilia   Address: [address] [address]   : 1970       Dear Cardiologist,     Thank you for taking the time to see Ronit for a pre-operative cardiac clearance evaluation for bariatric surgery. Please assist us with the following during your visit:     Initial patient evaluation   Arrange and expedite necessary testing.   Guide and facilitate follow-up treatment.   Provide a letter stating that the patient is cleared to proceed with bariatric surgery from a cardiac standpoint.   Indicate what treatment is needed pre-surgery (if any), during inpatient hospitalization and after surgery.     The evaluation must confirm that the patient is stable from a cardiac standpoint to proceed with the surgery. If you have any questions, feel free to contact us via our [facility] at [phone]. Please fax the evaluation to the number below.     Sincerely,     Comprehensive Weight Management Team     [facility]   [facility]   [address], [address], [place], [place], [address]     Ph: [phone]   Fax: [phone]

## 2024-12-03 NOTE — PROGRESS NOTES
Bariatric Task List updated.  Bariatric information/clearance letters sent to patient via [id number].  Bariatric Task List    Fax:  Please fax all paperwork to: [phone] -     Status:  Is patient a candidate for bariatric surgery?:  patient is a candidate for bariatric surgery -     Cleared to schedule surgeon consult?:  cleared to schedule surgeon consult -     Status:  surgery evaluation in process -     Surgeon: Dr. Tarango -     Tentative surgery month/year: tbd -        Insurance: Insurance:  [employer] -      Contact insurance to discuss coverage: Needed -       Cigna: PCP Recommendation and Medical Clearance:    -     HP Referral:    -      Advanced beneficiary notification (ABN) for Medicare patients for RD visits   and surgery:   -      Weight history:   -     Other:    -        Patient Info: Initial Weight:  237 -     Date of Initial Weight/Height:  11/11/2024 -     Goal Weight (lbs):  227 -     Required Weight Loss:  10lbs -     Surgery Type:  sleeve gastrectomy -     Multidisciplinary Meeting:    -        Dietician Visits: Structured weight loss required by insurance?:    -     Dietician Visit 1:  Needed -     Dietician Visit 2:  Needed -     Dietician Visit 3:  Needed -     Dietician Visit 4:    -     Dietician Visit 5:    -     Dietician Visit 6:    -     Dietician Visit additional:  Needed - monthly until surgery   Clearance from dietician to see surgeon?:    -     Dietician Notes:    -        Psychological Evaluation: Psych eval:  Needed -     Therapist letter of support:    -     Psychiatrist letter of support:    -     Establish care with therapist:    -     Complete eating disorder evaluation:    -     Letter of clearance from therapist/eating disorder program:    -     Other:    -        Lab Work: Complete Blood Count:  Completed -     Comprehensive Metabolic Panel:  Needed - Ordered 12/2/24 - AS   Vitamin D:  Needed - Ordered 12/2/24 - AS   PTH:  Needed - Ordered 12/2/24 - AS   Hgb A1c:  Needed  "- Ordered 12/2/24 - AS    Lipids: Completed -      TSH ([facility], [facility], [place]):   -       Ferritin:   -       Folate:   -       Testosterone, Total and Free:   -     Thiamine:   -     Vitamin A: Needed - Ordered 12/2/24 - AS   Vitamin B12: Needed - Ordered 12/2/24 - AS   Zinc:   -     C-peptide:   -     H. 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: Letter sent 12/3/24 - AS   Cardiac:  Needed - Letter sent to pt 12/2/24 - AS   Pain:   -     Dental:    -     Endocrine:    -     Gastroenterology:    -     Vascular Medicine:    -     Hematology:    -     Medical Weight Management:   -     Physical Therapy/Exercise:    -     Nephrology:  Needed - transplant team; Letter sent to pt 12/2/24 - AS   Neurology:    - Data deleted   Pulmonology:    -     Rheumatology:    -     Other:    -     Other:    -     Other:    -        Testing: UGI:    -     EGD:    -     Sleep Study: Needed - if needed by sleep medicine   Other:   -     Other:    -        PCP: Establish care with PCP:  Completed -     Follow up with PCP:    -     PCP letter of support:  Needed -        Health Maintenance: Colonoscopy(> 50 yrs or family hx):    -     Mammogram (> 40 yrs or family hx):    -     Pap Smear (women):   -     Other:   -     Other:    -        Stopping Smoking/ Alcohol Use/Cannabis Use: Quit tobacco use (3 months smoke free)?:    -     Quit date:    -     Quit alcohol use:   -     Quit date:   -     Other:   -     Quit date:   -        Patient Education:  Information Session:  Needed -     Attended New Consult Class?: Needed -     Given \"Making your decision\" handout?:  Yes -     Given \"A Roadmap to you Weight Loss Surgery\" handout?: Yes -     Given \"Epic Care Companion\" information?: Yes -     Attended support group?:  Needed -     Support plan in place?:  Needed -     Research " consents signed?:    -     Avoid NSAIDS/ Alternate Plan for Pain:   -        Additional Surgery Requirements: Review Coag plan:    -     HgA1c <8:    -     Inpatient pain consult:    -     Final nicotine screen:    -     Dental work complete:    -     Birth control plan:    -     Gallstone prevention plan (Actigall for 6 months postop): Needed -     Other:   -     Other:   -        Final Tasks:  Before surgery online preop class:  Needed -     After surgery online class:  Needed -     Nurse visit for information:  Needed -     Weight Check: Needed -     History and Physical: Pre-Assessment Clinic (PAC) -   Needed -     Final labs per clinic: Needed -     See MTM Pharmacist for medication review and plan for after surgery (if DM, transplant hx, greater than 10 meds):   -     Chest xray per clinic:   -     Electrocardiogram (ECG) per clinic:   -     Schedule postop appointments: Needed -     Other:   -   -        Notes:   -

## 2024-12-03 NOTE — LETTER
December 3, 2024   Re: Ronit ADRIAN Lilia   Address: [address] [address]   : 1970       Dear  Transplant Team ,     Thank you for taking the time to see Ronit for a pre-operative nephrology/transplant clearance evaluation for bariatric surgery.  Please assist us with the following during your visit:    Patient evaluation   Arrange and expedite necessary testing.   Guide and facilitate follow-up treatment.   Provide a letter stating that the patient is cleared to proceed with bariatric surgery from a nephrology standpoint.    Indicate what treatment is needed pre-surgery (if any), during inpatient hospitalization and after surgery.   If patient is on an anticoagulant, please provide a medication bridging plan for surgery.     The evaluation must confirm that the patient is stable from a nephrology/transplant standpoint to proceed with the surgery. If you have any questions, feel free to contact us via our [facility] at [phone]. Please fax the evaluation to the number below.     Sincerely,     Comprehensive Weight Management Team     [facility]   [facility]   [address], [address], [place], [address]     Ph: [phone]   Fax: [phone]

## 2024-12-10 ENCOUNTER — TELEPHONE (OUTPATIENT)
Dept: ENDOCRINOLOGY | Facility: CLINIC | Age: 54
End: 2024-12-10
Payer: COMMERCIAL

## 2024-12-10 NOTE — TELEPHONE ENCOUNTER
Left Voicemail (1st Attempt) and Sent Mychart (1st Attempt) for the patient to call back and schedule the following:    Appointment type: NEW GROUP BARIATRIC NURSE  Return date: first available  Specialty phone number: [phone]    Additional appointment(s) needed: ***    Appointment type: NEW ELIN NUTRITION CLASS  Return date: first available  Specialty phone number: [phone]    Appointment type: Bariatric Surgeon (meet and greet)  Provider: Dr Tarango  Return date: around 1/2/25  Specialty phone number: [phone]    Appointment type: PRE-SURG  Provider: Corinne Saul  Return date: around 3/2/25  Specialty phone number: [phone]    Appointment type: labs  Provider: Corinne Saul  Return date: first available  Specialty phone number: [phone]

## 2024-12-10 NOTE — TELEPHONE ENCOUNTER
Left Voicemail (1st Attempt) and Sent Mychart (1st Attempt) for the patient to call back and schedule the following:    Appointment type: NEW GROUP ELIN NURSE  Return date: first available  Specialty phone number: [phone]    Additional appointment(s) needed:     Appointment type: NEW GROUP ELIN NUTRITION  Return date: first available  Specialty phone number: [phone]    Appointment type: Bariatric surgeon (meet and greet)  Provider: Dr Tarango  Return date: around 1/2/25  Specialty phone number: [phone]    Appointment type: PRE-SURG  Provider: Smita Oh  Return date: around 3/2/25  Specialty phone number: [phone]    Additional Notes: Schedule labs per Smita Oh at [phone]

## 2024-12-10 NOTE — TELEPHONE ENCOUNTER
General Call    Contacts       Contact Date/Time Type Contact Phone/Fax    12/10/2024 03:38 PM CST Phone (Incoming) Ronit Rodrigues (Self) [phone] (H)          Reason for Call:  please send her lab orders to [facility] in [place].    [facility] msg her when done. Thanks      Could we send this information to you in [employer] or would you prefer to receive a phone call?:   Patient would like to be contacted via [employer]

## 2025-02-10 NOTE — Clinical Note
NBS patient, really adamant about wanting to work with Dr. Tarango. Task list started, Tali please send clearance letters.   Smita 
OK to take Tylenol/Acetaminophen at 5pm  for pain and every 6 hours after as needed.

## 2025-02-26 ENCOUNTER — VIRTUAL VISIT (OUTPATIENT)
Dept: ENDOCRINOLOGY | Facility: CLINIC | Age: 55
End: 2025-02-26
Payer: COMMERCIAL

## 2025-02-26 VITALS — HEIGHT: 65 IN | WEIGHT: 228 LBS | BODY MASS INDEX: 37.99 KG/M2

## 2025-02-26 DIAGNOSIS — L98.7 EXCESS SKIN OF ABDOMEN: ICD-10-CM

## 2025-02-26 DIAGNOSIS — K43.2 INCISIONAL HERNIA, WITHOUT OBSTRUCTION OR GANGRENE: Primary | ICD-10-CM

## 2025-02-26 RX ORDER — MULTIVITAMIN WITH IRON
500 TABLET ORAL DAILY
COMMUNITY

## 2025-02-26 ASSESSMENT — PAIN SCALES - GENERAL: PAINLEVEL_OUTOF10: NO PAIN (0)

## 2025-02-26 NOTE — LETTER
2/26/2025       RE: Ronit Rodrigues  [address]  [place] [address]     Dear Colleague,    Thank you for referring your patient, Ronit Rodrigues, to the [facility] [place] at [facility]. Please see a copy of my visit note below.    Ronit Rodrigues is a 54 year old who is being evaluated via a billable video visit.      How would you like to obtain your AVS? MyChart  If the video visit is dropped, the invitation should be resent by: Text to cell phone: [phone]  Will anyone else be joining your video visit? No  If patient encounters technical issues they should call [phone]    During this virtual visit the patient is located in [place], patient verifies this as the location during the entirety of this visit.   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  Postoperatively the patient did develop a ventral hernia and had this repaired.  She did recur after this and has had a recurrence that continues to bother her since that time.  We have had a number of discussions about repair and the importance of weight loss to make that repair more successful.  The patient is not interested at this time or would like at the very least to avoid bariatric surgery as a primary approach for weight loss.  She has a history of pancreatitis which has made use of the GLP-1 receptor analogs not acceptable for her.  I did review her CT scan of the abdomen from [facility] on June 2, 2022 which revealed a significant lower abdominal hernia with definite disruption of the anterior abdominal wall all the way up to the xiphoid.  This is a virtual visit so I am not able to examine her.  The patient is eager to have her hernia repaired.  She is also reporting issues of skin problems and dermatitis type symptoms and the  underlying skin around to her pubis.  At this point her my most recent weight being 237 pounds she also notes back and neck pain for which she is getting therapy I am going to recommend that she drop her weight under 200 pounds.  We will also plan to have plastic surgery consult on her and repeat her CT scan of the abdomen with oral contrast only.  I will see her in my clinic in 2 months.  She does have gallstones and we many need to do a concommintant hernia repair end of visit 9:50AM.      Again, thank you for allowing me to participate in the care of your patient.      Sincerely,    Steven Tarango MD

## 2025-02-26 NOTE — PROGRESS NOTES
Ronit Rodrigues is a 54 year old who is being evaluated via a billable video visit.      How would you like to obtain your AVS? MyChart  If the video visit is dropped, the invitation should be resent by: Text to cell phone: [phone]  Will anyone else be joining your video visit? No  If patient encounters technical issues they should call [phone]    During this virtual visit the patient is located in [place], patient verifies this as the location during the entirety of this visit.   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  Postoperatively the patient did develop a ventral hernia and had this repaired.  She did recur after this and has had a recurrence that continues to bother her since that time.  We have had a number of discussions about repair and the importance of weight loss to make that repair more successful.  The patient is not interested at this time or would like at the very least to avoid bariatric surgery as a primary approach for weight loss.  She has a history of pancreatitis which has made use of the GLP-1 receptor analogs not acceptable for her.  I did review her CT [facility] which revealed a significant lower abdominal hernia with definite disruption of the anterior abdominal wall all the way up to the xiphoid.  This is a virtual visit so I am not able to examine her.  The patient is eager to have her hernia repaired.  She is also reporting issues of skin problems and dermatitis type symptoms and the underlying skin around to her pubis.  At this point her my most recent weight being 237 pounds she also notes back and neck pain for which she is getting therapy I am going to recommend that she drop her weight under 200 pounds.  We will also plan to have plastic surgery consult on her and repeat her CT scan of the abdomen with oral  contrast only.  I will see her in my clinic in 2 months.  She does have gallstones and we many need to do a concommintant hernia repair end of visit 9:50AM.

## 2025-02-26 NOTE — Clinical Note
Patient is here for consideration of repair of a large ventral hernia.  She needs to lose approximately 37 pounds to be below 200 pounds.  She is having significant skin issues I like to have her see plastic surgery.  I like to see her in my clinic in 2 months.  She is going to try and lose some weight in the interim.  I like to repeat her CT scan of the abdomen and pelvis with oral contrast only

## 2025-02-26 NOTE — NURSING NOTE
"(   Chief Complaint   Patient presents with    New Patient     Meet and greet    )    ( Weight: 103.4 kg (228 lb) (Pt reported) )  ( Height: 163.8 cm (5' 4.5\") )  ( BMI (Calculated): 38.53 )  (   )  (   )  (   )  (   )  (   )  (   )    (   )  (   )  (   )  (   )  (   )  (   )  (   )    (   Patient Active Problem List   Diagnosis    Kidney replaced by transplant    Pancreas replaced by transplant (H)    Encounter for long-term (current) use of medications    Dyslipidemia    BMI 34.0-34.9,adult    Asthma    Hypertension    Reflux pancreatitis of transplanted pancreas    Dehydration    Hypomagnesemia    Hypokalemia    History of blood transfusion    Ventral hernia    Morbid obesity (H)    SBO (small bowel obstruction) (H)    Acute pancreatitis, unspecified complication status, unspecified pancreatitis type    VERNA (acute kidney injury)    Reflux pancreatitis    Immunosuppressed status    Clinical diagnosis of COVID-19    Allergic rhinitis    Bilateral hand pain    Carpal tunnel syndrome of left wrist    Cataract    Frozen shoulder    History of acute pancreatitis    History of small bowel obstruction    Inadequate sleep hygiene    Hip flexor tightness, right    Other ascites    )  (   Current Outpatient Medications   Medication Sig Dispense Refill    chlorthalidone (HYGROTEN) 25 MG tablet Take 1 tablet by mouth daily. 32 tablet 0    cyanocobalamin (VITAMIN B-12) 500 MCG tablet Take 500 mcg by mouth daily.      mycophenolic acid (MYFORTIC - GENERIC EQUIVALENT) 360 MG EC tablet Take 1 tablet (360 mg) by mouth 2 times daily Annual appointment needed for medication refills 60 tablet 0    phentermine (ADIPEX-P) 37.5 MG tablet Take 1/2 tablet every morning with breakfast. Increase to 1 tablet after 1 week if needed. 90 tablet 3    Prenatal Vit-Fe Fumarate-FA (PNV PRENATAL PLUS MULTIVITAMIN) 27-1 MG TABS Take 1 tablet by mouth daily 30 tablet 11    PROGRAF 0.5 MG PO CAPSULE Take 1 capsule (0.5 mg) by mouth every morning Take " with 1 mg capsule. Total dose is 1.5 mg every morning and 1 mg every evening. 30 capsule 3    PROGRAF 1 MG PO CAPSULE Take 1 capsule (1 mg) by mouth 2 times daily Take with 0.5 mg capsules. Total dose is 1.5 mg every morning and 1 mg every evening. 60 capsule 3    albuterol (PROAIR HFA/PROVENTIL HFA/VENTOLIN HFA) 108 (90 Base) MCG/ACT inhaler Inhale 1-2 puffs into the lungs every 4 hours as needed for wheezing      buPROPion (WELLBUTRIN XL) 150 MG 24 hr tablet Take 1 tablet (150 mg) by mouth every morning. 90 tablet 3    Magnesium Oxide -Mg Supplement 500 MG TABS Take by mouth.      vitamin D3 (CHOLECALCIFEROL) 50 mcg (2000 units) tablet Take 2 tablets by mouth daily      )  ( Diabetes Eval:    )    ( Pain Eval:  No Pain (0) )    ( Wound Eval:       )    (   History   Smoking Status    Never   Smokeless Tobacco    Never    )    ( Signed By:  Diego Alvarado, EMT; February 26, 2025; 9:16 AM )

## 2025-03-02 ENCOUNTER — HEALTH MAINTENANCE LETTER (OUTPATIENT)
Age: 55
End: 2025-03-02

## 2025-04-09 DIAGNOSIS — Z94.83 PANCREAS REPLACED BY TRANSPLANT (H): ICD-10-CM

## 2025-04-09 DIAGNOSIS — Z94.0 KIDNEY REPLACED BY TRANSPLANT: Primary | ICD-10-CM

## 2025-04-09 RX ORDER — TACROLIMUS 0.5 MG/1
0.5 CAPSULE, GELATIN COATED ORAL EVERY MORNING
Qty: 30 CAPSULE | Refills: 0 | Status: SHIPPED | OUTPATIENT
Start: 2025-04-09

## 2025-04-09 RX ORDER — MYCOPHENOLIC ACID 360 MG/1
360 TABLET, DELAYED RELEASE ORAL 2 TIMES DAILY
Qty: 60 TABLET | Refills: 0 | Status: SHIPPED | OUTPATIENT
Start: 2025-04-09

## 2025-04-09 RX ORDER — CHLORTHALIDONE 25 MG/1
25 TABLET ORAL DAILY
Qty: 32 TABLET | Refills: 0 | Status: SHIPPED | OUTPATIENT
Start: 2025-04-09

## 2025-04-09 RX ORDER — TACROLIMUS 1 MG/1
1 CAPSULE, GELATIN COATED ORAL 2 TIMES DAILY
Qty: 60 CAPSULE | Refills: 0 | Status: SHIPPED | OUTPATIENT
Start: 2025-04-09

## 2025-04-21 ENCOUNTER — VIRTUAL VISIT (OUTPATIENT)
Dept: ENDOCRINOLOGY | Facility: CLINIC | Age: 55
End: 2025-04-21
Payer: COMMERCIAL

## 2025-04-21 ENCOUNTER — TELEPHONE (OUTPATIENT)
Dept: ENDOCRINOLOGY | Facility: CLINIC | Age: 55
End: 2025-04-21

## 2025-04-21 VITALS — WEIGHT: 230 LBS | HEIGHT: 64 IN | BODY MASS INDEX: 39.27 KG/M2

## 2025-04-21 DIAGNOSIS — E66.01 MORBID OBESITY (H): ICD-10-CM

## 2025-04-21 RX ORDER — PHENTERMINE HYDROCHLORIDE 37.5 MG/1
TABLET ORAL
Qty: 90 TABLET | Refills: 3 | Status: CANCELLED | OUTPATIENT
Start: 2025-04-21

## 2025-04-21 NOTE — LETTER
2025       RE: Ronit Rodrigues  [address] [facility] [address]     Dear Colleague,    Thank you for referring your patient, Ronit Rodrigues, to the [facility] WEIGHT MANAGEMENT CLINIC [place] at [facility]. Please see a copy of my visit note below.      Return Medical Weight Management Note     Ronit Rodrigues  MRN:  [id number]  :  1970  JANY:  2025    Dear Skyla Srinivasan MD,    I had the pleasure of seeing your patient Ronit Rodrigues. She is a 55 year old female who I am continuing to see for treatment of obesity related to:        2025     7:46 AM   --   I have the following health issues associated with obesity Sleep Apnea       Assessment & Plan  Problem List Items Addressed This Visit          Endocrine Diagnoses    Morbid obesity (H)        Comprehensive weight management follow up visit  Hx of kidney/pancreas tx   Seen by me in  for weight loss for hernia repair  Didn't follow up until  and saw Smita Oh  Working on weight loss to <200 lbs for hernia repair with Dr Tarango. Hernia extremely uncomfortable.  Needs CT scan and plastic surgery consult Dr Tarango ordered and follow up with Dr Tarango in 1 month    Smita refilled phentermine at last visit and started bupropion but pt never started it  Taking phentermine 37.5mg in the morning but feels it wears off in the evening    Currently taking the following medications that can help with weight loss/weight mgmt:  Phentermine 37.5mg daily    Past weight loss medication history and considerations:  GLP1 contraindicated due to hx of pancreatitis.  Topiramate brain fog when taking 10 years ago    Plan today:  Refill phentermine. Can take 1/2 in the AM and 1/2 early afternoon if doesn't affect sleep. Take consistently for best effect.  Consider adding naltrexone. No opioids. Info sent for pt and prescription sent.  Pt defers starting  "bupropion as she is concerned about taking with phentermine    Follow up plan:  MTM 6 weeks  Corinne 4 months return MWM    INTERVAL HISTORY:  CURRENT WEIGHT:   230 lbs 0 oz      Initial Weight (lbs): 218 lbs  Last Visits Weight: 103 kg (227 lb)  Cumulative weight loss (lbs): -12  Weight Loss Percentage: -5.5%          External Order Results on 11/01/2024   Component Date Value Ref Range Status     Tacrolimus(FK-506) (External) 11/01/2024 5.0  See scan ng/mL Final       MEDICATIONS:   Current Outpatient Medications   Medication Sig Dispense Refill     chlorthalidone (HYGROTON) 25 MG tablet Take 1 tablet (25 mg) by mouth daily. 32 tablet 0     cyanocobalamin (VITAMIN B-12) 500 MCG tablet Take 500 mcg by mouth daily.       mycophenolic acid (GENERIC EQUIVALENT) 360 MG EC tablet Take 1 tablet (360 mg) by mouth 2 times daily. Annual appointment needed for medication refills 60 tablet 0     phentermine (ADIPEX-P) 37.5 MG tablet Take 1/2 tablet every morning with breakfast. Increase to 1 tablet after 1 week if needed. 90 tablet 3     Prenatal Vit-Fe Fumarate-FA (PNV PRENATAL PLUS MULTIVITAMIN) 27-1 MG TABS Take 1 tablet by mouth daily 30 tablet 11     PROGRAF (BRAND) 0.5 MG capsule Take 1 capsule (0.5 mg) by mouth every morning. Take with 1 mg capsule. Total dose is 1.5 mg every morning and 1 mg every evening. 30 capsule 0     PROGRAF (BRAND) 1 MG capsule Take 1 capsule (1 mg) by mouth 2 times daily. Take with 0.5 mg capsules. Total dose is 1.5 mg every morning and 1 mg every evening. 60 capsule 0         PHYSICAL EXAM:  Objective   Ht 1.638 m (5' 4.49\")   Wt 104.3 kg (230 lb)   BMI 38.88 kg/m             Vitals:  No vitals were obtained today due to virtual visit.    GENERAL: alert and no distress  EYES: Eyes grossly normal to inspection.  No discharge or erythema, or obvious scleral/conjunctival abnormalities.  RESP: No audible wheeze, cough, or visible cyanosis.    SKIN: Visible skin clear. No significant rash, " abnormal pigmentation or lesions.  NEURO: Cranial nerves grossly intact.  Mentation and speech appropriate for age.  PSYCH: Appropriate affect, tone, and pace of words        Sincerely,    Corinne Saul PA-C      20 minutes spent by me on the date of the encounter doing chart review, history and exam, documentation and further activities per the note    Ronit is a 55 year old who is being evaluated via a billable telephone visit.    What phone number would you like to be contacted at? home  How would you like to obtain your AVS? MyChart  Originating Location (pt. Location): Home    Distant Location (provider location):  On-site  Telephone visit completed due to the patient did not have access to video, while the distant provider did.  Phone call duration: 20 minutes      Again, thank you for allowing me to participate in the care of your patient.      Sincerely,    Corinne Saul PA-C

## 2025-04-21 NOTE — TELEPHONE ENCOUNTER
General Call    Contacts       Contact Date/Time Type Contact Phone/Fax    04/21/2025 08:59 AM CDT Phone (Incoming) Ronit Rodrigues (Self) [phone] (H)          Reason for Call:  pt missed appt today and hoping can talk for a minute      Could we [facility] this [facility] to you [facility] or would you prefer to receive a phone call?:   Patient would prefer a phone call   Okay to leave a detailed message?: Yes at Cell number on file:    Telephone Information:   Mobile [phone]

## 2025-04-21 NOTE — PROGRESS NOTES
Return Medical Weight Management Note     Ronit Rodrigues  MRN:  [id number]  :  1970  JANY:  2025    Dear Skyla Srinivasan MD,    I had the pleasure of seeing your patient Ronit Rodrigues. She is a 55 year old female who I am continuing to see for treatment of obesity related to:        2025     7:46 AM   --   I have the following health issues associated with obesity Sleep Apnea       Assessment & Plan   Problem List Items Addressed This Visit          Endocrine Diagnoses    Morbid obesity (H)        Comprehensive weight management follow up visit  Hx of kidney/pancreas tx   Seen by me in 2016 for weight loss for hernia repair  Didn't follow up until  and saw Smita Oh  Working on weight loss to <200 lbs for hernia repair with Dr Tarango. Hernia extremely uncomfortable.  Needs CT scan and plastic surgery consult Dr Tarango ordered and follow up with Dr Tarango in 1 month    Smita refilled phentermine at last visit and started bupropion but pt never started it  Taking phentermine 37.5mg in the morning but feels it wears off in the evening    Currently taking the following medications that can help with weight loss/weight mgmt:  Phentermine 37.5mg daily    Past weight loss medication history and considerations:  GLP1 contraindicated due to hx of pancreatitis.  Topiramate brain fog when taking 10 years ago    Plan today:  Refill phentermine. Can take 1/2 in the AM and 1/2 early afternoon if doesn't affect sleep. Take consistently for best effect.  Consider adding naltrexone. No opioids. Info sent for pt and prescription sent.  Pt defers starting bupropion as she is concerned about taking with phentermine    Follow up plan:  MTM 6 weeks  Corinne 4 months return MWM    INTERVAL HISTORY:  CURRENT WEIGHT:   230 lbs 0 oz      Initial Weight (lbs): 218 lbs  Last Visits Weight: 103 kg (227 lb)  Cumulative weight loss (lbs): -12  Weight Loss Percentage: -5.5%          External  "Order Results on 11/01/2024   Component Date Value Ref Range Status    Tacrolimus(FK-506) (External) 11/01/2024 5.0  See scan ng/mL Final       MEDICATIONS:   Current Outpatient Medications   Medication Sig Dispense Refill    chlorthalidone (HYGROTON) 25 MG tablet Take 1 tablet (25 mg) by mouth daily. 32 tablet 0    cyanocobalamin (VITAMIN B-12) 500 MCG tablet Take 500 mcg by mouth daily.      mycophenolic acid (GENERIC EQUIVALENT) 360 MG EC tablet Take 1 tablet (360 mg) by mouth 2 times daily. Annual appointment needed for medication refills 60 tablet 0    phentermine (ADIPEX-P) 37.5 MG tablet Take 1/2 tablet every morning with breakfast. Increase to 1 tablet after 1 week if needed. 90 tablet 3    Prenatal Vit-Fe Fumarate-FA (PNV PRENATAL PLUS MULTIVITAMIN) 27-1 MG TABS Take 1 tablet by mouth daily 30 tablet 11    PROGRAF (BRAND) 0.5 MG capsule Take 1 capsule (0.5 mg) by mouth every morning. Take with 1 mg capsule. Total dose is 1.5 mg every morning and 1 mg every evening. 30 capsule 0    PROGRAF (BRAND) 1 MG capsule Take 1 capsule (1 mg) by mouth 2 times daily. Take with 0.5 mg capsules. Total dose is 1.5 mg every morning and 1 mg every evening. 60 capsule 0         PHYSICAL EXAM:  Objective    Ht 1.638 m (5' 4.49\")   Wt 104.3 kg (230 lb)   BMI 38.88 kg/m             Vitals:  No vitals were obtained today due to virtual visit.    GENERAL: alert and no distress  EYES: Eyes grossly normal to inspection.  No discharge or erythema, or obvious scleral/conjunctival abnormalities.  RESP: No audible wheeze, cough, or visible cyanosis.    SKIN: Visible skin clear. No significant rash, abnormal pigmentation or lesions.  NEURO: Cranial nerves grossly intact.  Mentation and speech appropriate for age.  PSYCH: Appropriate affect, tone, and pace of words        Sincerely,    Corinne Saul PA-C      20 minutes spent by me on the date of the encounter doing chart review, history and exam, documentation and further " activities per the note    Ronit is a 55 year old who is being evaluated via a billable telephone visit.    What phone number would you like to be contacted at? home  How would you like to obtain your AVS? MyChart  Originating Location (pt. Location): Home    Distant Location (provider location):  On-site  Telephone visit completed due to the patient did not have access to video, while the distant provider did.  Phone call duration: 20 minutes

## 2025-05-06 ENCOUNTER — TELEPHONE (OUTPATIENT)
Dept: ENDOCRINOLOGY | Facility: CLINIC | Age: 55
End: 2025-05-06
Payer: COMMERCIAL

## 2025-05-06 DIAGNOSIS — E66.812 CLASS 2 SEVERE OBESITY WITH SERIOUS COMORBIDITY AND BODY MASS INDEX (BMI) OF 38.0 TO 38.9 IN ADULT, UNSPECIFIED OBESITY TYPE (H): Primary | ICD-10-CM

## 2025-05-06 DIAGNOSIS — E66.01 CLASS 2 SEVERE OBESITY WITH SERIOUS COMORBIDITY AND BODY MASS INDEX (BMI) OF 38.0 TO 38.9 IN ADULT, UNSPECIFIED OBESITY TYPE (H): Primary | ICD-10-CM

## 2025-05-06 RX ORDER — NALTREXONE HYDROCHLORIDE 50 MG/1
TABLET, FILM COATED ORAL
Qty: 30 TABLET | Refills: 3 | Status: SHIPPED | OUTPATIENT
Start: 2025-05-06

## 2025-05-06 NOTE — TELEPHONE ENCOUNTER
General Call      Reason for Call:  medication    What are your questions or concerns:  Patient called stating Corinne prescribed a new medication on 4/21. Pt is unsure what medication but stated it's not Phentermine. Pt thinks it may be generic Wellbutrin    Date of last appointment with provider: 4/21/25    Could we send this information to you in [facility] or would you prefer to receive a phone call?:   Patient would prefer a phone call   Okay to leave a detailed message?: Yes at Cell number on file:    Telephone Information:   Mobile [phone]

## 2025-05-24 DIAGNOSIS — Z94.83 PANCREAS REPLACED BY TRANSPLANT (H): ICD-10-CM

## 2025-05-24 DIAGNOSIS — Z94.0 KIDNEY REPLACED BY TRANSPLANT: ICD-10-CM

## 2025-05-27 RX ORDER — MYCOPHENOLIC ACID 360 MG/1
360 TABLET, DELAYED RELEASE ORAL 2 TIMES DAILY
Qty: 60 TABLET | Refills: 0 | OUTPATIENT
Start: 2025-05-27

## 2025-05-27 NOTE — TELEPHONE ENCOUNTER
Patient not followed by transplant nephrologist. Re-route prescriptions to general nephrologist, Dr. Nelia Saul

## 2025-07-03 ENCOUNTER — VIRTUAL VISIT (OUTPATIENT)
Dept: ENDOCRINOLOGY | Facility: CLINIC | Age: 55
End: 2025-07-03
Payer: COMMERCIAL

## 2025-07-03 VITALS — BODY MASS INDEX: 39.27 KG/M2 | HEIGHT: 64 IN | WEIGHT: 230 LBS

## 2025-07-03 DIAGNOSIS — E66.812 CLASS 2 SEVERE OBESITY WITH SERIOUS COMORBIDITY AND BODY MASS INDEX (BMI) OF 38.0 TO 38.9 IN ADULT, UNSPECIFIED OBESITY TYPE (H): ICD-10-CM

## 2025-07-03 DIAGNOSIS — E66.01 CLASS 2 SEVERE OBESITY WITH SERIOUS COMORBIDITY AND BODY MASS INDEX (BMI) OF 38.0 TO 38.9 IN ADULT, UNSPECIFIED OBESITY TYPE (H): ICD-10-CM

## 2025-07-03 RX ORDER — NALTREXONE HYDROCHLORIDE 50 MG/1
TABLET, FILM COATED ORAL
Qty: 30 TABLET | Refills: 3 | Status: SHIPPED | OUTPATIENT
Start: 2025-07-03

## 2025-07-03 ASSESSMENT — PAIN SCALES - GENERAL: PAINLEVEL_OUTOF10: MODERATE PAIN (5)

## 2025-07-03 NOTE — PROGRESS NOTES
Return Medical Weight Management Note     Ronit Rodrigues  MRN:  [id number]  :  1970  JANY:  7/3/2025    Dear Skyla Srinivasan MD,    I had the pleasure of seeing your patient Ronit Rodrigues. She is a 55 year old female who I am continuing to see for treatment of obesity related to:        2025     7:46 AM   --   I have the following health issues associated with obesity Sleep Apnea       Assessment & Plan   Problem List Items Addressed This Visit    None  Visit Diagnoses         Class 2 severe obesity with serious comorbidity and body mass index (BMI) of 38.0 to 38.9 in adult, unspecified obesity type (H)        Relevant Medications    naltrexone (DEPADE/REVIA) 50 MG tablet             Comprehensive weight management follow up visit    Hx of kidney/pancreas tx   Seen by me in  for weight loss for hernia repair  Didn't follow up until  and saw Smita Oh  Working on weight loss to <200 lbs for hernia repair with Dr Tarango. Hernia extremely uncomfortable.  CT scheduled 25 and then she plans to follow up with Dr Tarango in clinic    Currently taking the following medications that can help with weight loss/weight mgmt:  Phentermine   Naltrexone     Past weight loss medication history and considerations:  GLP1 contraindicated due to hx of pancreatitis ?reflux pancreatitis in  and .  Pt denies having hx of pancreatitis. Lipase was 517 and then 68  Topiramate brain fog when taking 10 years ago  Naltrexone prescribed but never started, fell in water bucket at home      Plan today:  Continue phentermine 37.5mg  Start naltrexone 50mg start with 1/2 tab and increase to full tab    Follow up plan:  ESAU 2-3 mo return NATANAEL Sun 4-6 mo    INTERVAL HISTORY:    CURRENT WEIGHT:   230 lbs 0 oz    Initial Weight (lbs): 218 lbs  Last Visits Weight: 104.3 kg (230 lb)  Cumulative weight loss (lbs): -12  Weight Loss Percentage: -5.5%        7/3/2025    12:03 PM   Changes and  "Difficulties   I have made the following changes to my diet since my last visit: none   I have made the following changes to my activity/exercise since my last visit: none             7/3/2025    12:03 PM   Weight Loss Medication History Reviewed With Patient   Which weight loss medications are you currently taking on a regular basis? Naltrexone    Phentermine   Are you having any side effects from the weight loss medication that we have prescribed you? No       External Order Results on 11/01/2024   Component Date Value Ref Range Status    Tacrolimus(FK-506) (External) 11/01/2024 5.0  See scan ng/mL Final       MEDICATIONS:   Current Outpatient Medications   Medication Sig Dispense Refill    chlorthalidone (HYGROTON) 25 MG tablet Take 1 tablet (25 mg) by mouth daily. 32 tablet 0    mycophenolic acid (GENERIC EQUIVALENT) 360 MG EC tablet Take 1 tablet (360 mg) by mouth 2 times daily. Annual appointment needed for medication refills 60 tablet 0    naltrexone (DEPADE/REVIA) 50 MG tablet Take 1/2 tablet once daily 1-2 hours prior to worst cravings for 1 week, then increase to 1 tablet daily as directed if tolerating 30 tablet 3    phentermine (ADIPEX-P) 37.5 MG tablet Take 1/2 tablet every morning with breakfast. Increase to 1 tablet after 1 week if needed. 90 tablet 3    Prenatal Vit-Fe Fumarate-FA (PNV PRENATAL PLUS MULTIVITAMIN) 27-1 MG TABS Take 1 tablet by mouth daily 30 tablet 11    PROGRAF (BRAND) 0.5 MG capsule Take 1 capsule (0.5 mg) by mouth every morning. Take with 1 mg capsule. Total dose is 1.5 mg every morning and 1 mg every evening. 30 capsule 0    PROGRAF (BRAND) 1 MG capsule Take 1 capsule (1 mg) by mouth 2 times daily. Take with 0.5 mg capsules. Total dose is 1.5 mg every morning and 1 mg every evening. 60 capsule 0         PHYSICAL EXAM:  Objective    Ht 1.638 m (5' 4.49\")   Wt 104.3 kg (230 lb)   BMI 38.88 kg/m      Vitals - Patient Reported  Pain Score: Moderate Pain (5)  Pain Loc: " Foot        GENERAL: alert and no distress  EYES: Eyes grossly normal to inspection.  No discharge or erythema, or obvious scleral/conjunctival abnormalities.  RESP: No audible wheeze, cough, or visible cyanosis.    SKIN: Visible skin clear. No significant rash, abnormal pigmentation or lesions.  NEURO: Cranial nerves grossly intact.  Mentation and speech appropriate for age.  PSYCH: Appropriate affect, tone, and pace of words        Sincerely,    Corinne Saul PA-C      20 minutes spent by me on the date of the encounter doing chart review, history and exam, documentation and further activities per the note    Virtual Visit Details    Type of service:  Telephone Visit   Phone call duration: 20 minutes   Originating Location (pt. Location): Home    Distant Location (provider location):  Off-site  Telephone visit completed due to the patient did not have access to video, while the distant provider did.

## 2025-07-03 NOTE — LETTER
7/3/2025       RE: Ronit Rodrigues  [address]  [facility] [address]     Dear Colleague,    Thank you for referring your patient, Ronit Rodrigues, to the [facility] WEIGHT MANAGEMENT CLINIC [place] at [facility]. Please see a copy of my visit note below.      Return Medical Weight Management Note     Ronit Rodrigues  MRN:  [id number]  :  1970  JANY:  7/3/2025    Dear Skyla Srinivasan MD,    I had the pleasure of seeing your patient Ronit Rodrigues. She is a 55 year old female who I am continuing to see for treatment of obesity related to:        2025     7:46 AM   --   I have the following health issues associated with obesity Sleep Apnea       Assessment & Plan  Problem List Items Addressed This Visit    None  Visit Diagnoses         Class 2 severe obesity with serious comorbidity and body mass index (BMI) of 38.0 to 38.9 in adult, unspecified obesity type (H)        Relevant Medications    naltrexone (DEPADE/REVIA) 50 MG tablet             Comprehensive weight management follow up visit    Hx of kidney/pancreas tx   Seen by me in 2016 for weight loss for hernia repair  Didn't follow up until  and saw Smita Oh  Working on weight loss to <200 lbs for hernia repair with Dr Tarango. Hernia extremely uncomfortable.  CT scheduled 25 and then she plans to follow up with Dr Tarango in clinic    Currently taking the following medications that can help with weight loss/weight mgmt:  Phentermine   Naltrexone     Past weight loss medication history and considerations:  GLP1 contraindicated due to hx of pancreatitis ?reflux pancreatitis in  and .  Pt denies having hx of pancreatitis. Lipase was 517 and then 68  Topiramate brain fog when taking 10 years ago  Naltrexone prescribed but never started, fell in water bucket at home      Plan today:  Continue phentermine 37.5mg  Start naltrexone 50mg start with  1/2 tab and increase to full tab    Follow up plan:  MTM 2-3 mo return NATANAEL Sun 4-6 mo    INTERVAL HISTORY:    CURRENT WEIGHT:   230 lbs 0 oz    Initial Weight (lbs): 218 lbs  Last Visits Weight: 104.3 kg (230 lb)  Cumulative weight loss (lbs): -12  Weight Loss Percentage: -5.5%        7/3/2025    12:03 PM   Changes and Difficulties   I have made the following changes to my diet since my last visit: none   I have made the following changes to my activity/exercise since my last visit: none             7/3/2025    12:03 PM   Weight Loss Medication History Reviewed With Patient   Which weight loss medications are you currently taking on a regular basis? Naltrexone    Phentermine   Are you having any side effects from the weight loss medication that we have prescribed you? No       External Order Results on 11/01/2024   Component Date Value Ref Range Status     Tacrolimus(FK-506) (External) 11/01/2024 5.0  See scan ng/mL Final       MEDICATIONS:   Current Outpatient Medications   Medication Sig Dispense Refill     chlorthalidone (HYGROTON) 25 MG tablet Take 1 tablet (25 mg) by mouth daily. 32 tablet 0     mycophenolic acid (GENERIC EQUIVALENT) 360 MG EC tablet Take 1 tablet (360 mg) by mouth 2 times daily. Annual appointment needed for medication refills 60 tablet 0     naltrexone (DEPADE/REVIA) 50 MG tablet Take 1/2 tablet once daily 1-2 hours prior to worst cravings for 1 week, then increase to 1 tablet daily as directed if tolerating 30 tablet 3     phentermine (ADIPEX-P) 37.5 MG tablet Take 1/2 tablet every morning with breakfast. Increase to 1 tablet after 1 week if needed. 90 tablet 3     Prenatal Vit-Fe Fumarate-FA (PNV PRENATAL PLUS MULTIVITAMIN) 27-1 MG TABS Take 1 tablet by mouth daily 30 tablet 11     PROGRAF (BRAND) 0.5 MG capsule Take 1 capsule (0.5 mg) by mouth every morning. Take with 1 mg capsule. Total dose is 1.5 mg every morning and 1 mg every evening. 30 capsule 0     PROGRAF (BRAND) 1 MG capsule  "Take 1 capsule (1 mg) by mouth 2 times daily. Take with 0.5 mg capsules. Total dose is 1.5 mg every morning and 1 mg every evening. 60 capsule 0         PHYSICAL EXAM:  Objective   Ht 1.638 m (5' 4.49\")   Wt 104.3 kg (230 lb)   BMI 38.88 kg/m      Vitals - Patient Reported  Pain Score: Moderate Pain (5)  Pain Loc: Foot        GENERAL: alert and no distress  EYES: Eyes grossly normal to inspection.  No discharge or erythema, or obvious scleral/conjunctival abnormalities.  RESP: No audible wheeze, cough, or visible cyanosis.    SKIN: Visible skin clear. No significant rash, abnormal pigmentation or lesions.  NEURO: Cranial nerves grossly intact.  Mentation and speech appropriate for age.  PSYCH: Appropriate affect, tone, and pace of words        Sincerely,    Corinne Saul PA-C      20 minutes spent by me on the date of the encounter doing chart review, history and exam, documentation and further activities per the note    Virtual Visit Details    Type of service:  Telephone Visit   Phone call duration: 20 minutes   Originating Location (pt. Location): Home    Distant Location (provider location):  Off-site  Telephone visit completed due to the patient did not have access to video, while the distant provider did.    Again, thank you for allowing me to participate in the care of your patient.      Sincerely,    Corinne Saul PA-C    "

## 2025-07-03 NOTE — NURSING NOTE
Current patient location: [address] [place] [address]    Is the patient currently in the state of [place]? YES    Visit mode: CONVERT TO TELEPHONE per pt request    If the visit is dropped, the patient can be reconnected by:TELEPHONE VISIT: Call cell phone:   Telephone Information:   Mobile [phone]     Will anyone else be joining the visit? NO  (If patient encounters technical issues they should call [phone])    Are changes needed to the allergy or medication list? Yes pt stated is taking otc supplements, vitamin d and a hair skin and nails supplement, both once a day and is not allergic to apple juice    Are refills needed on medications prescribed by this physician? NO    Rooming Documentation:  Questionnaire(s) completed    Reason for visit: RECHECK    Nory MILLRE

## 2025-07-03 NOTE — PATIENT INSTRUCTIONS
CONTRAVE (bupropion and naltrexone)    Contrave is specifically prescribed for Weight Management. It is a combination of two medications, Naltrexone and Bupropion (Wellbutrin). Contrave is specifically formulated to help control hunger and cravings. It targets 2 key areas of the brain:    Mesolimbic reward system: Involved with feeling pleasure during rewarding experiences like eating. This may cause intense cravings, making food a source of comfort.     2. Hypothalamus: Regulates hunger and satiety signals. This part of the brain balances the body's energy needs and can be influenced by emotional states, potentially affecting eating behavior.     Watch a video on how Contrave works: [email or web address]    For some of our patients the medication works right away. Other patients don't feel much of a change but find they've lost weight. Like all weight loss medications, Contrave works best when you help it work. This means:  1. Having less tempting high calorie (fattening) food around the house or office (for people with strong cravings this is very important)   2. Staying away from situations or people that may trigger your cravings.   3. Eating out only one time or less each week.  4. Eating your meals at a table with the TV or computer off.    Dosing instructions:   Week 1: Take 1 tablet by mouth once daily in AM   Week 2: if tolerating without issues, increase to 1 tablet in the morning and 1 tablet in early PM   Week 3: if tolerating without issues, increase to 2 tablets in the morning and 1 tablet in early PM   Week 4 & after: if tolerating without issues, increase to 2 tablets in the morning and 2 tablets in early PM.    Take with food to better tolerate the medication.     Savings Card and Lower Cost Option(s):   If insurance covers Contrave, patients may pay as low as $20 with savings card. If patients don t have insurance or if insurance doesn t cover Contrave, they will pay no  more than $199 with savings card.   Savings card is available at: [email or web address]/savings-[email or web address]-support/    Otherwise, can can Contrave for $99 per month through [employer]  [employer] [facility] Savings Program where their mail order pharmacy is utilized. Through this program, RX is sent to one of their mail order pharmacies, then RX mailed to your home.     Side Effects:   The most common side effect include: nausea; constipation; headache; vomiting; dizziness; insomnia, diarrhea; trouble sleeping; and dry mouth.     WARNING: This medication blocks the action of opioid type pain medications. If you routinely take any medication like Codeine, Oxycontin, Percocet, Morphine, Dilaudid or Methodone, you cannot take Contrave. Please talk to your Weight Management care team if you will be prescribed one of these agents and are on Contrave.     Prior Authorization Process for Weight Management Medications: You are being prescribed a medication that will likely need to undergo a prior authorization.  A prior authorization is when the clinic needs to fill out a form that is sent to your insurance company to obtain coverage for that medication. The prescription will NOT automatically go to your pharmacy today if it needs a Prior Authorization. If the medication prior authorization is approved, the care team will contact you and the prescription will be released to your pharmacy. If denied, we will work to try and appeal the prior authorization if possible. The initial prior authorization process takes up to 5-10 business days and appeals can take up to 30 days. If you do not hear from us at the end of that time, you may contact the clinic.    Upcoming Surgery? If you are planning on having an elective surgery, you need to taper down and off Contrave 1 week prior to surgery. Talk to your Weight Management team as to how to taper down and off Contrave before surgery as tapering plan will depend on how long you have been on  Contrave and at what dose you are currently taking.     For any questions or concerns please send a [employer] message to our team or call our weight management call center at [phone] during regular business hours. For questions during evenings or weekends your messages will be addressed during the next business day.  For emergencies please call 911 or seek immediate medical care.     In order to get refills of this or any medication we prescribe you must be seen in the medical weight mgmt clinic every 2-4 months. Please have your pharmacy fax a refill request to [phone].      Bupropion HCl 12 HR Extended Release Tablet 100 mg  Uses  This medicine is used for the following purposes:  attention deficit hyperactivity disorder  depression  eating disorders  stop smoking  Instructions  Swallow the medicine without crushing or chewing it.  This medicine may be taken with or without food.  Try to avoid taking the medicine at bedtime.  Keep the medicine at room temperature. Avoid heat and direct light.  It may take several weeks for this medicine to fully work.  It is important that you keep taking each dose of this medicine on time even if you are feeling well.  If you forget to take a dose on time, take it as soon as you remember. If it is almost time for the next dose, do not take the missed dose. Return to your normal dosing schedule. Do not take 2 doses of this medicine at one time.  Please tell your doctor and pharmacist about all the medicines you take. Include both prescription and over-the-counter medicines. Also tell them about any vitamins, herbal medicines, or anything else you take for your health.  Do not suddenly stop taking this medicine. Check with your doctor before stopping.  Cautions  Tell your doctor and pharmacist if you ever had an allergic reaction to a medicine. Symptoms of an allergic reaction can include trouble breathing, skin rash, itching, swelling, or severe dizziness.  This medicine  is associated with an increased risk for seizures. Please ask your doctor whether you may be at risk for having a seizure while on this medicine.  Do not use the medication any more than instructed.  Your ability to stay alert or to react quickly may be impaired by this medicine. Do not drive or operate machinery until you know how this medicine will affect you.  Please check with your doctor before drinking alcohol while on this medicine.  Family should check on the patient often. Call the doctor if patient becomes more depressed, has thoughts of suicide, or shows changes in behavior.  Call the doctor if there are any signs of confusion or unusual changes in behavior.  Tell the doctor or pharmacist if you are pregnant, planning to be pregnant, or breastfeeding.  Ask your pharmacist if this medicine can interact with any of your other medicines. Be sure to tell them about all the medicines you take.  Do not start or stop any other medicines without first speaking to your doctor or pharmacist.  Do not share this medicine with anyone who has not been prescribed this medicine.  This medicine is associated with increased risk of death in certain patients. Please speak with your doctor about the benefits and risks of using this medicine.  This medicine can cause serious side effects in some patients. Important information from the [place] Food and Drug Administration (FDA) is available from your pharmacist. Please review it carefully with your pharmacist to understand the risks associated with this medicine.  Side Effects  The following is a list of some common side effects from this medicine. Please speak with your doctor about what you should do if you experience these or other side effects.  agitated feeling or trouble sleeping  decreased appetite  increased appetite  constipation  dizziness  drowsiness or sedation  dry mouth  headaches  high blood pressure  itching  muscle pain  nausea  skin irritation such as redness,  itching, rash, or burning  problems with sexual functions or desire  sore throat  stomach upset or abdominal pain  sweating  vomiting  weight loss  If you have any of the following side effects, you may be getting too much medicine. Please contact your doctor to let them know about these side effects.  change in behavior  confusion  diarrhea  fainting  hallucinations (unusual thoughts, seeing or hearing things that are not real)  rapid heartbeat  pain in the joints  tight or rigid muscles  muscle trembling  Call your doctor or get medical help right away if you notice any of these more serious side effects:  chest pain  fast or irregular heart beats  dilation of the pupils  seizures  shortness of breath  A few people may have an allergic reactions to this medicine. Symptoms can include difficulty breathing, skin rash, itching, swelling, or severe dizziness. If you notice any of these symptoms, seek medical help quickly.  Extra  Please speak with your doctor, nurse, or pharmacist if you have any questions about this medicine.  [email or web address]  IMPORTANT NOTE: This document tells you briefly how to take your medicine, but it does not tell you all there is to know about it.Your doctor or pharmacist may give you other documents about your medicine. Please talk to them if you have any questions.Always follow their advice. There is a more complete description of this medicine available in English.Scan this code on your smartphone or tablet or use the web address below. You can also ask your pharmacist for a printout. If you have any questions, please ask your pharmacist.     2021 [employer]      NALTREXONE    We are considering starting Naltrexone. Start with 1/2 tab 1-2 hours prior to the time you have the most trouble with cravings or extra hunger. If you are doing well you may switch to a whole tablet taken at the same time period.     WARNING: This medication blocks the action  "of opioid type pain medications. If you routinely take any medication like Codeine, Oxycontin,Percocet,Morphine,Dilaudid or Methodone, do not take this until you have talked with weight management staff. If you are planning surgery you should stop Naltrexone 4 days prior to the surgery. If you have an injury that requires pain medication, make sure the health care staff knows you take Naltrexone.     Naltrexone is a medication that is used most often to help people who are troubled by dependence on prescription pain killers or alcohol. It has also been found to help with weight loss. Although it's not currently FDA approved for weight loss, it has been used safely for a number of years to help people who are carrying extra weight.     Just how Naltrexone helps with weight loss has not been exactly determined.  It seems to work by quieting down brain signals related to strong food cravings. Many of our patients use the word \"addiction\" to describe their feelings and constant thoughts about food. It makes sense then to treat the feeling of dependence on food, outside of real hunger, with a medication designed to help with other sorts of dependence.     Our patients on Naltrexone find that they:    >feel less interest in food   >think less about food and eating and have more time to think of other things   >find it easier to push the plate away   >have an easier time eating less    For some of our patients, these feelings are very immediate. Other patients, don't feel much of a change but find they've lost weight. Like all weight loss medications, Naltrexone works best when you help it work. This means:  1. Having less tempting high calorie (fattening) food around the house or office. (For people with strong cravings this is very important.)   2. Staying away from situations or people that may trigger your cravings .   3. Eating out only one time or less each week.  4. Eating your meals at a table with the TV or " computer off.    Side-effects. Naltrexone is generally well tolerated. The main side-effect we see is  nausea or a woozy feeling. A small number of people feel quite ill. Most people have a mild reaction and some people have no reaction at all.  The good news is that this feeling does go away.     In order to avoid nausea, please start the medication with half a pill for the first few days. Go on to a full pill if you are feeling well.      If you  are nauseated on 1/2 a pill it is okay to cut back to 1/4 pill ( a very small amount). Take this for a couple of days and work your way back up to a 1/2 pill and then a whole pill. Taking the medication at night or with food  to start also may help prevent the feeling of nausea.       For any questions or concerns please send a [employer] message to our team or call our weight management call center at [phone] during regular business hours. For questions during evenings or weekends your messages will be addressed during the next business day.  For emergencies please call 911 or seek immediate medical care.     (Do not stop taking it if you don't think it's working. For some people it works without them knowing it.)      Please refer to the pharmacy insert for more information on side-effects. Since many pharmacists are not familiar with the use of naltrexone in weight loss, calling the nurse at [phone] will get you the most accurate information.  In order to get refills of this or any medication we prescribe you must be seen in the medical weight mgmt clinic every 2-3 months. Please have your pharmacy fax a refill request to [phone].

## 2025-07-17 ENCOUNTER — TELEPHONE (OUTPATIENT)
Dept: ENDOCRINOLOGY | Facility: CLINIC | Age: 55
End: 2025-07-17
Payer: COMMERCIAL

## 2025-07-17 NOTE — TELEPHONE ENCOUNTER
Left Voicemail (1st Attempt) and Sent Mychart (1st Attempt) for the patient to call back and schedule the following:    Appointment type: [facility]  Provider: Carol Santoyo Edwards or Ha  Return date: Ocrober 2025  Specialty phone number: [phone]    Additional appointment(s) needed:     Appointment type: [facility]  Provider: LEWIS Saul  Return date: January 2026  Specialty phone number: [phone]

## 2025-08-21 ENCOUNTER — TELEPHONE (OUTPATIENT)
Dept: TRANSPLANT | Facility: CLINIC | Age: 55
End: 2025-08-21
Payer: COMMERCIAL